# Patient Record
Sex: FEMALE | Race: WHITE
[De-identification: names, ages, dates, MRNs, and addresses within clinical notes are randomized per-mention and may not be internally consistent; named-entity substitution may affect disease eponyms.]

---

## 2018-06-05 ENCOUNTER — RECORD ABSTRACTING (OUTPATIENT)
Age: 78
End: 2018-06-05

## 2018-06-05 DIAGNOSIS — Z87.19 PERSONAL HISTORY OF OTHER DISEASES OF THE DIGESTIVE SYSTEM: ICD-10-CM

## 2018-06-25 ENCOUNTER — APPOINTMENT (OUTPATIENT)
Dept: HEART AND VASCULAR | Facility: CLINIC | Age: 78
End: 2018-06-25
Payer: MEDICARE

## 2018-06-28 ENCOUNTER — APPOINTMENT (OUTPATIENT)
Dept: HEART AND VASCULAR | Facility: CLINIC | Age: 78
End: 2018-06-28
Payer: MEDICARE

## 2018-06-28 VITALS — BODY MASS INDEX: 34.2 KG/M2 | HEIGHT: 63 IN | WEIGHT: 193 LBS

## 2018-06-28 VITALS — DIASTOLIC BLOOD PRESSURE: 70 MMHG | SYSTOLIC BLOOD PRESSURE: 130 MMHG

## 2018-06-28 PROCEDURE — 99214 OFFICE O/P EST MOD 30 MIN: CPT | Mod: 25

## 2018-06-28 PROCEDURE — 93000 ELECTROCARDIOGRAM COMPLETE: CPT

## 2018-10-04 ENCOUNTER — APPOINTMENT (OUTPATIENT)
Dept: HEART AND VASCULAR | Facility: CLINIC | Age: 78
End: 2018-10-04
Payer: MEDICARE

## 2018-10-04 VITALS — HEIGHT: 63 IN | WEIGHT: 192 LBS | BODY MASS INDEX: 34.02 KG/M2

## 2018-10-04 VITALS — SYSTOLIC BLOOD PRESSURE: 130 MMHG | DIASTOLIC BLOOD PRESSURE: 75 MMHG

## 2018-10-04 PROCEDURE — G0008: CPT

## 2018-10-04 PROCEDURE — 90662 IIV NO PRSV INCREASED AG IM: CPT

## 2018-10-04 PROCEDURE — 99214 OFFICE O/P EST MOD 30 MIN: CPT

## 2018-10-04 PROCEDURE — 93000 ELECTROCARDIOGRAM COMPLETE: CPT

## 2018-10-05 LAB
ALBUMIN SERPL ELPH-MCNC: 4.3 G/DL
ALP BLD-CCNC: 97 U/L
ALT SERPL-CCNC: 17 U/L
ANION GAP SERPL CALC-SCNC: 19 MMOL/L
AST SERPL-CCNC: 14 U/L
BASOPHILS # BLD AUTO: 0.04 K/UL
BASOPHILS NFR BLD AUTO: 0.4 %
BILIRUB SERPL-MCNC: 0.3 MG/DL
BUN SERPL-MCNC: 18 MG/DL
CALCIUM SERPL-MCNC: 10 MG/DL
CHLORIDE SERPL-SCNC: 101 MMOL/L
CHOLEST SERPL-MCNC: 229 MG/DL
CHOLEST/HDLC SERPL: 3.9 RATIO
CO2 SERPL-SCNC: 22 MMOL/L
CREAT SERPL-MCNC: 0.66 MG/DL
EOSINOPHIL # BLD AUTO: 0.04 K/UL
EOSINOPHIL NFR BLD AUTO: 0.4 %
FOLATE SERPL-MCNC: 7.5 NG/ML
GLUCOSE SERPL-MCNC: 211 MG/DL
HBA1C MFR BLD HPLC: 8.6 %
HCT VFR BLD CALC: 42.3 %
HDLC SERPL-MCNC: 59 MG/DL
HGB BLD-MCNC: 13.4 G/DL
IMM GRANULOCYTES NFR BLD AUTO: 0.4 %
LDLC SERPL CALC-MCNC: 128 MG/DL
LYMPHOCYTES # BLD AUTO: 1.45 K/UL
LYMPHOCYTES NFR BLD AUTO: 14.2 %
MAN DIFF?: NORMAL
MCHC RBC-ENTMCNC: 27.8 PG
MCHC RBC-ENTMCNC: 31.7 GM/DL
MCV RBC AUTO: 87.8 FL
MONOCYTES # BLD AUTO: 0.38 K/UL
MONOCYTES NFR BLD AUTO: 3.7 %
NEUTROPHILS # BLD AUTO: 8.24 K/UL
NEUTROPHILS NFR BLD AUTO: 80.9 %
PLATELET # BLD AUTO: 283 K/UL
POTASSIUM SERPL-SCNC: 4.7 MMOL/L
PROT SERPL-MCNC: 6.9 G/DL
RBC # BLD: 4.82 M/UL
RBC # FLD: 14.6 %
SODIUM SERPL-SCNC: 142 MMOL/L
T3 SERPL-MCNC: 90 NG/DL
T3FREE SERPL-MCNC: 2.59 PG/ML
T4 FREE SERPL-MCNC: 1.2 NG/DL
T4 SERPL-MCNC: 7.4 UG/DL
TRIGL SERPL-MCNC: 211 MG/DL
TSH SERPL-ACNC: 1.99 UIU/ML
VIT B12 SERPL-MCNC: 628 PG/ML
WBC # FLD AUTO: 10.19 K/UL

## 2019-02-12 ENCOUNTER — APPOINTMENT (OUTPATIENT)
Dept: HEART AND VASCULAR | Facility: CLINIC | Age: 79
End: 2019-02-12

## 2019-02-26 ENCOUNTER — APPOINTMENT (OUTPATIENT)
Dept: HEART AND VASCULAR | Facility: CLINIC | Age: 79
End: 2019-02-26
Payer: MEDICARE

## 2019-02-26 VITALS — HEIGHT: 63 IN | WEIGHT: 196 LBS | BODY MASS INDEX: 34.73 KG/M2

## 2019-02-26 VITALS — SYSTOLIC BLOOD PRESSURE: 130 MMHG | DIASTOLIC BLOOD PRESSURE: 80 MMHG

## 2019-02-26 VITALS — SYSTOLIC BLOOD PRESSURE: 140 MMHG | DIASTOLIC BLOOD PRESSURE: 80 MMHG

## 2019-02-26 PROCEDURE — 93000 ELECTROCARDIOGRAM COMPLETE: CPT

## 2019-02-26 NOTE — PHYSICAL EXAM
[General Appearance - Well Developed] : well developed [Normal Appearance] : normal appearance [Well Groomed] : well groomed [General Appearance - Well Nourished] : well nourished [No Deformities] : no deformities [General Appearance - In No Acute Distress] : no acute distress [Normal Conjunctiva] : the conjunctiva exhibited no abnormalities [Eyelids - No Xanthelasma] : the eyelids demonstrated no xanthelasmas [Normal Oral Mucosa] : normal oral mucosa [No Oral Pallor] : no oral pallor [No Oral Cyanosis] : no oral cyanosis [Normal Jugular Venous A Waves Present] : normal jugular venous A waves present [Normal Jugular Venous V Waves Present] : normal jugular venous V waves present [No Jugular Venous Richardson A Waves] : no jugular venous richardson A waves [Respiration, Rhythm And Depth] : normal respiratory rhythm and effort [Exaggerated Use Of Accessory Muscles For Inspiration] : no accessory muscle use [Auscultation Breath Sounds / Voice Sounds] : lungs were clear to auscultation bilaterally [Abdomen Soft] : soft [Abdomen Tenderness] : non-tender [Abdomen Mass (___ Cm)] : no abdominal mass palpated [Abnormal Walk] : normal gait [Gait - Sufficient For Exercise Testing] : the gait was sufficient for exercise testing [Nail Clubbing] : no clubbing of the fingernails [Cyanosis, Localized] : no localized cyanosis [Petechial Hemorrhages (___cm)] : no petechial hemorrhages [Skin Color & Pigmentation] : normal skin color and pigmentation [] : no rash [No Venous Stasis] : no venous stasis [Skin Lesions] : no skin lesions [No Skin Ulcers] : no skin ulcer [No Xanthoma] : no  xanthoma was observed

## 2019-02-26 NOTE — HISTORY OF PRESENT ILLNESS
[FreeTextEntry1] : Patient presents for routine followup for diabetes and hypertension she denies any symptoms of headaches diplopia leg edema chest pain or shortness of breath. Her home glucose testing usually in the morning is about 170 she has had mixed compliance with dietary restrictions. Most recent hemoglobin A1c was 8.2 measured at her endocrinologist Dr. Garcia she was seen her approximately one month's time\par 2/26/19 recent eye exam c/w macular degeneration \par blood sugars volatile \par non compliant with ADA diet \par last HGAIC 8.6 TRIG 211\par \par Cr .66

## 2019-02-26 NOTE — DISCUSSION/SUMMARY
[FreeTextEntry1] : EKG at the time of the visit revealed a sinus rhythm with a right bundle branch block which is unchanged from prior tracings. Patient's blood pressure was well controlled on her current time he'll start was renewed she will also remain on metoprolol her blood sugars will be obtained at her next endocrinology visit diet and exercise compliance with medications were reinforced.

## 2019-02-26 NOTE — REVIEW OF SYSTEMS
[Blurry Vision] : no blurred vision [Eyeglasses] : not currently wearing eyeglasses [Shortness Of Breath] : no shortness of breath [Dysuria] : no dysuria

## 2019-02-27 LAB
ALBUMIN SERPL ELPH-MCNC: 4.3 G/DL
ALP BLD-CCNC: 101 U/L
ALT SERPL-CCNC: 20 U/L
ANION GAP SERPL CALC-SCNC: 12 MMOL/L
AST SERPL-CCNC: 14 U/L
BASOPHILS # BLD AUTO: 0.08 K/UL
BASOPHILS NFR BLD AUTO: 0.8 %
BILIRUB SERPL-MCNC: 0.3 MG/DL
BUN SERPL-MCNC: 16 MG/DL
CALCIUM SERPL-MCNC: 10.1 MG/DL
CHLORIDE SERPL-SCNC: 100 MMOL/L
CHOLEST SERPL-MCNC: 214 MG/DL
CHOLEST/HDLC SERPL: 3.9 RATIO
CO2 SERPL-SCNC: 27 MMOL/L
CREAT SERPL-MCNC: 0.63 MG/DL
EOSINOPHIL # BLD AUTO: 0.1 K/UL
EOSINOPHIL NFR BLD AUTO: 1 %
GLUCOSE SERPL-MCNC: 240 MG/DL
HBA1C MFR BLD HPLC: 8.2 %
HCT VFR BLD CALC: 43.6 %
HDLC SERPL-MCNC: 55 MG/DL
HGB BLD-MCNC: 12.8 G/DL
IMM GRANULOCYTES NFR BLD AUTO: 0.5 %
LDLC SERPL CALC-MCNC: 106 MG/DL
LYMPHOCYTES # BLD AUTO: 1.61 K/UL
LYMPHOCYTES NFR BLD AUTO: 16.8 %
MAN DIFF?: NORMAL
MCHC RBC-ENTMCNC: 26.6 PG
MCHC RBC-ENTMCNC: 29.4 GM/DL
MCV RBC AUTO: 90.5 FL
MONOCYTES # BLD AUTO: 0.61 K/UL
MONOCYTES NFR BLD AUTO: 6.4 %
NEUTROPHILS # BLD AUTO: 7.12 K/UL
NEUTROPHILS NFR BLD AUTO: 74.5 %
PLATELET # BLD AUTO: 281 K/UL
POTASSIUM SERPL-SCNC: 4.7 MMOL/L
PROT SERPL-MCNC: 6.9 G/DL
RBC # BLD: 4.82 M/UL
RBC # FLD: 13.8 %
SODIUM SERPL-SCNC: 139 MMOL/L
T3 SERPL-MCNC: 92 NG/DL
T3FREE SERPL-MCNC: 2.48 PG/ML
T4 FREE SERPL-MCNC: 1.2 NG/DL
T4 SERPL-MCNC: 6.2 UG/DL
TRIGL SERPL-MCNC: 266 MG/DL
TSH SERPL-ACNC: 2.36 UIU/ML
WBC # FLD AUTO: 9.57 K/UL

## 2019-04-15 ENCOUNTER — RX RENEWAL (OUTPATIENT)
Age: 79
End: 2019-04-15

## 2019-06-03 ENCOUNTER — APPOINTMENT (OUTPATIENT)
Dept: HEART AND VASCULAR | Facility: CLINIC | Age: 79
End: 2019-06-03
Payer: MEDICARE

## 2019-06-03 VITALS — HEIGHT: 63 IN | WEIGHT: 195 LBS | BODY MASS INDEX: 34.55 KG/M2

## 2019-06-03 VITALS — DIASTOLIC BLOOD PRESSURE: 80 MMHG | SYSTOLIC BLOOD PRESSURE: 130 MMHG

## 2019-06-03 DIAGNOSIS — Z87.440 PERSONAL HISTORY OF URINARY (TRACT) INFECTIONS: ICD-10-CM

## 2019-06-03 PROCEDURE — 93880 EXTRACRANIAL BILAT STUDY: CPT

## 2019-06-03 PROCEDURE — 99214 OFFICE O/P EST MOD 30 MIN: CPT

## 2019-06-03 PROCEDURE — 93000 ELECTROCARDIOGRAM COMPLETE: CPT

## 2019-06-03 PROCEDURE — 93306 TTE W/DOPPLER COMPLETE: CPT

## 2019-06-04 ENCOUNTER — RESULT CHARGE (OUTPATIENT)
Age: 79
End: 2019-06-04

## 2019-06-04 LAB
APPEARANCE: ABNORMAL
BACTERIA: ABNORMAL
BILIRUBIN URINE: NEGATIVE
BLOOD URINE: NEGATIVE
COLOR: YELLOW
GLUCOSE QUALITATIVE U: ABNORMAL
HYALINE CASTS: 4 /LPF
KETONES URINE: NEGATIVE
LEUKOCYTE ESTERASE URINE: ABNORMAL
MICROSCOPIC-UA: NORMAL
NITRITE URINE: NEGATIVE
PH URINE: 6
PROTEIN URINE: NORMAL
RED BLOOD CELLS URINE: 4 /HPF
SPECIFIC GRAVITY URINE: 1.02
SQUAMOUS EPITHELIAL CELLS: >27 /HPF
UROBILINOGEN URINE: NORMAL
WHITE BLOOD CELLS URINE: 127 /HPF

## 2019-06-04 NOTE — HISTORY OF PRESENT ILLNESS
[FreeTextEntry1] : the patient is a 79-year-old female with long-standing history of hypertension and diabetes who recently was noted to have elevated liver function tests and was seen by a gastroenterologist for evaluation of elevated LFTs with a sonogram which is spending\par She denies any current symptoms of abdominal pain she does have some dysuria and frequency and does believe she might have a urine infection. EKG reveals a right bundle branch block.\par

## 2019-06-04 NOTE — PHYSICAL EXAM
[Normal Appearance] : normal appearance [General Appearance - Well Developed] : well developed [Well Groomed] : well groomed [General Appearance - Well Nourished] : well nourished [No Deformities] : no deformities [General Appearance - In No Acute Distress] : no acute distress [Normal Conjunctiva] : the conjunctiva exhibited no abnormalities [Eyelids - No Xanthelasma] : the eyelids demonstrated no xanthelasmas [Normal Oral Mucosa] : normal oral mucosa [No Oral Cyanosis] : no oral cyanosis [No Oral Pallor] : no oral pallor [Normal Jugular Venous A Waves Present] : normal jugular venous A waves present [Normal Jugular Venous V Waves Present] : normal jugular venous V waves present [No Jugular Venous Richardson A Waves] : no jugular venous richardson A waves [Respiration, Rhythm And Depth] : normal respiratory rhythm and effort [Auscultation Breath Sounds / Voice Sounds] : lungs were clear to auscultation bilaterally [Exaggerated Use Of Accessory Muscles For Inspiration] : no accessory muscle use [Normal S1] : normal S1 [II] : a grade 2 [Normal S2] : normal S2 [Right Carotid Bruit] : right carotid bruit heard [Left Carotid Bruit] : left carotid bruit heard [Abdomen Soft] : soft [Abdomen Tenderness] : non-tender [Abdomen Mass (___ Cm)] : no abdominal mass palpated [Abnormal Walk] : normal gait [Gait - Sufficient For Exercise Testing] : the gait was sufficient for exercise testing [Cyanosis, Localized] : no localized cyanosis [Nail Clubbing] : no clubbing of the fingernails [Skin Color & Pigmentation] : normal skin color and pigmentation [Petechial Hemorrhages (___cm)] : no petechial hemorrhages [] : no rash [No Venous Stasis] : no venous stasis [Skin Lesions] : no skin lesions [No Skin Ulcers] : no skin ulcer [No Xanthoma] : no  xanthoma was observed [FreeTextEntry1] : pos suprapubic pain no cvat

## 2019-06-04 NOTE — DISCUSSION/SUMMARY
[FreeTextEntry1] : EKG at the time of the visit revealed a sinus rhythm with a right bundle branch block which is unchanged from prior tracings. Patient's blood pressure was well controlled on her current time he'll start was renewed she will also remain on metoprolol her blood sugars will be obtained at her next endocrinology visit diet and exercise compliance with medications were reinforced.\par LVEF  is preserved based on echocardiogram soft homogeneous plaque was noted on carotid duplex target LDL should be less than 70 in keeping with primary prevention for diabetic patients\par patient has signs and symptoms of a UTI urinalysis was obtained and did show pyuria as well as bacteria ciprofloxacin was referred to the patient

## 2019-07-15 ENCOUNTER — RX RENEWAL (OUTPATIENT)
Age: 79
End: 2019-07-15

## 2019-08-08 ENCOUNTER — RX RENEWAL (OUTPATIENT)
Age: 79
End: 2019-08-08

## 2019-10-04 ENCOUNTER — LABORATORY RESULT (OUTPATIENT)
Age: 79
End: 2019-10-04

## 2019-10-04 ENCOUNTER — APPOINTMENT (OUTPATIENT)
Dept: HEART AND VASCULAR | Facility: CLINIC | Age: 79
End: 2019-10-04
Payer: MEDICARE

## 2019-10-04 ENCOUNTER — NON-APPOINTMENT (OUTPATIENT)
Age: 79
End: 2019-10-04

## 2019-10-04 VITALS
HEIGHT: 63 IN | DIASTOLIC BLOOD PRESSURE: 80 MMHG | SYSTOLIC BLOOD PRESSURE: 135 MMHG | BODY MASS INDEX: 34.2 KG/M2 | WEIGHT: 193 LBS

## 2019-10-04 VITALS — SYSTOLIC BLOOD PRESSURE: 135 MMHG | DIASTOLIC BLOOD PRESSURE: 80 MMHG

## 2019-10-04 PROCEDURE — 93000 ELECTROCARDIOGRAM COMPLETE: CPT

## 2019-10-04 PROCEDURE — 99214 OFFICE O/P EST MOD 30 MIN: CPT

## 2019-10-04 NOTE — HISTORY OF PRESENT ILLNESS
[Preoperative Visit] : for a medical evaluation prior to surgery [Scheduled Procedure ___] : a [unfilled] [Date of Surgery ___] : on [unfilled] [Good] : Good [Surgeon Name ___] : surgeon: [unfilled] [Chills] : no chills [Fever] : no fever [Fatigue] : no fatigue [Dyspnea] : no dyspnea [Cough] : no cough [Chest Pain] : no chest pain [Urinary Frequency] : no urinary frequency [Dysuria] : no dysuria [Nausea] : no nausea [Vomiting] : no vomiting [de-identified] : Tita [FreeTextEntry1] : Preeop for gallbladder surgery\par On meds for DM and BP \par No recent chest pain or dyspnea \par prior echo LVEF 55% \par Baseline RBBB

## 2019-10-04 NOTE — DISCUSSION/SUMMARY
[Procedure Intermediate Risk] : the procedure risk is intermediate [Patient Low Risk] : the patient is a low surgical risk [Optimized for Surgery] : the patient is optimized for surgery [FreeTextEntry1] : Stop ASA  1 week preop

## 2019-10-04 NOTE — ASSESSMENT
[FreeTextEntry1] : Impression \par optimized for planned gall blader surgery \par Stop asa 1 week preop

## 2019-10-04 NOTE — PHYSICAL EXAM
[General Appearance - Well Developed] : well developed [Well Groomed] : well groomed [Normal Appearance] : normal appearance [No Deformities] : no deformities [General Appearance - Well Nourished] : well nourished [General Appearance - In No Acute Distress] : no acute distress [Normal Conjunctiva] : the conjunctiva exhibited no abnormalities [Eyelids - No Xanthelasma] : the eyelids demonstrated no xanthelasmas [Normal Oral Mucosa] : normal oral mucosa [No Oral Pallor] : no oral pallor [No Oral Cyanosis] : no oral cyanosis [Normal Jugular Venous V Waves Present] : normal jugular venous V waves present [Normal Jugular Venous A Waves Present] : normal jugular venous A waves present [No Jugular Venous Richardson A Waves] : no jugular venous richardson A waves [Exaggerated Use Of Accessory Muscles For Inspiration] : no accessory muscle use [Respiration, Rhythm And Depth] : normal respiratory rhythm and effort [Auscultation Breath Sounds / Voice Sounds] : lungs were clear to auscultation bilaterally [Heart Rate And Rhythm] : heart rate and rhythm were normal [Heart Sounds] : normal S1 and S2 [Murmurs] : no murmurs present [Abdomen Tenderness] : non-tender [Abdomen Soft] : soft [Abdomen Mass (___ Cm)] : no abdominal mass palpated [Abnormal Walk] : normal gait [Gait - Sufficient For Exercise Testing] : the gait was sufficient for exercise testing [Cyanosis, Localized] : no localized cyanosis [Nail Clubbing] : no clubbing of the fingernails [Petechial Hemorrhages (___cm)] : no petechial hemorrhages [] : no ischemic changes

## 2019-10-06 ENCOUNTER — MED ADMIN CHARGE (OUTPATIENT)
Age: 79
End: 2019-10-06

## 2019-10-06 LAB
25(OH)D3 SERPL-MCNC: 40.1 NG/ML
ALBUMIN SERPL ELPH-MCNC: 4 G/DL
ALP BLD-CCNC: 111 U/L
ALT SERPL-CCNC: 12 U/L
ANION GAP SERPL CALC-SCNC: 14 MMOL/L
APTT BLD: 32.6 SEC
AST SERPL-CCNC: 16 U/L
BASOPHILS # BLD AUTO: 0.08 K/UL
BASOPHILS NFR BLD AUTO: 0.8 %
BILIRUB SERPL-MCNC: 0.3 MG/DL
BUN SERPL-MCNC: 17 MG/DL
CALCIUM SERPL-MCNC: 9.5 MG/DL
CHLORIDE SERPL-SCNC: 99 MMOL/L
CHOLEST SERPL-MCNC: 189 MG/DL
CHOLEST/HDLC SERPL: 4.3 RATIO
CO2 SERPL-SCNC: 25 MMOL/L
CREAT SERPL-MCNC: 0.61 MG/DL
EOSINOPHIL # BLD AUTO: 0.28 K/UL
EOSINOPHIL NFR BLD AUTO: 2.9 %
ESTIMATED AVERAGE GLUCOSE: 203 MG/DL
FOLATE SERPL-MCNC: >20 NG/ML
GLUCOSE SERPL-MCNC: 227 MG/DL
HBA1C MFR BLD HPLC: 8.7 %
HCT VFR BLD CALC: 38.4 %
HDLC SERPL-MCNC: 44 MG/DL
HGB BLD-MCNC: 11.8 G/DL
IMM GRANULOCYTES NFR BLD AUTO: 0.3 %
INR PPP: 1.12 RATIO
LDLC SERPL CALC-MCNC: 112 MG/DL
LYMPHOCYTES # BLD AUTO: 1.63 K/UL
LYMPHOCYTES NFR BLD AUTO: 16.7 %
MAN DIFF?: NORMAL
MCHC RBC-ENTMCNC: 26.4 PG
MCHC RBC-ENTMCNC: 30.7 GM/DL
MCV RBC AUTO: 85.9 FL
MONOCYTES # BLD AUTO: 0.51 K/UL
MONOCYTES NFR BLD AUTO: 5.2 %
NEUTROPHILS # BLD AUTO: 7.21 K/UL
NEUTROPHILS NFR BLD AUTO: 74.1 %
PLATELET # BLD AUTO: 332 K/UL
POTASSIUM SERPL-SCNC: 4.6 MMOL/L
PROT SERPL-MCNC: 6.7 G/DL
PT BLD: 12.9 SEC
RBC # BLD: 4.47 M/UL
RBC # FLD: 13.6 %
SODIUM SERPL-SCNC: 138 MMOL/L
T3 SERPL-MCNC: 100 NG/DL
T3FREE SERPL-MCNC: 2.51 PG/ML
T3RU NFR SERPL: 1 TBI
T4 FREE SERPL-MCNC: 1.3 NG/DL
T4 SERPL-MCNC: 6.9 UG/DL
TRIGL SERPL-MCNC: 165 MG/DL
TSH SERPL-ACNC: 1.84 UIU/ML
VIT B12 SERPL-MCNC: 1234 PG/ML
WBC # FLD AUTO: 9.74 K/UL

## 2019-10-28 ENCOUNTER — APPOINTMENT (OUTPATIENT)
Dept: HEART AND VASCULAR | Facility: CLINIC | Age: 79
End: 2019-10-28

## 2019-11-05 ENCOUNTER — RX RENEWAL (OUTPATIENT)
Age: 79
End: 2019-11-05

## 2019-12-18 ENCOUNTER — RX RENEWAL (OUTPATIENT)
Age: 79
End: 2019-12-18

## 2020-01-20 ENCOUNTER — NON-APPOINTMENT (OUTPATIENT)
Age: 80
End: 2020-01-20

## 2020-01-20 ENCOUNTER — APPOINTMENT (OUTPATIENT)
Dept: HEART AND VASCULAR | Facility: CLINIC | Age: 80
End: 2020-01-20
Payer: MEDICARE

## 2020-01-20 VITALS
HEIGHT: 63 IN | WEIGHT: 177 LBS | BODY MASS INDEX: 31.36 KG/M2 | DIASTOLIC BLOOD PRESSURE: 80 MMHG | HEART RATE: 71 BPM | SYSTOLIC BLOOD PRESSURE: 145 MMHG

## 2020-01-20 PROCEDURE — 99214 OFFICE O/P EST MOD 30 MIN: CPT

## 2020-01-20 PROCEDURE — 36415 COLL VENOUS BLD VENIPUNCTURE: CPT

## 2020-01-20 PROCEDURE — 93000 ELECTROCARDIOGRAM COMPLETE: CPT

## 2020-01-20 RX ORDER — DULAGLUTIDE 1.5 MG/.5ML
1.5 INJECTION, SOLUTION SUBCUTANEOUS
Refills: 0 | Status: DISCONTINUED | COMMUNITY
End: 2020-01-20

## 2020-01-20 RX ORDER — DEXLANSOPRAZOLE 60 MG/1
60 CAPSULE, DELAYED RELEASE ORAL
Refills: 0 | Status: DISCONTINUED | COMMUNITY
End: 2020-01-20

## 2020-01-20 RX ORDER — CIPROFLOXACIN HYDROCHLORIDE 500 MG/1
500 TABLET, FILM COATED ORAL TWICE DAILY
Qty: 20 | Refills: 0 | Status: DISCONTINUED | COMMUNITY
Start: 2019-06-03 | End: 2020-01-20

## 2020-01-20 NOTE — PHYSICAL EXAM
[General Appearance - Well Developed] : well developed [Well Groomed] : well groomed [Normal Appearance] : normal appearance [General Appearance - Well Nourished] : well nourished [No Deformities] : no deformities [General Appearance - In No Acute Distress] : no acute distress [Normal Conjunctiva] : the conjunctiva exhibited no abnormalities [Eyelids - No Xanthelasma] : the eyelids demonstrated no xanthelasmas [Normal Oral Mucosa] : normal oral mucosa [No Oral Pallor] : no oral pallor [Normal Jugular Venous A Waves Present] : normal jugular venous A waves present [No Oral Cyanosis] : no oral cyanosis [Normal Jugular Venous V Waves Present] : normal jugular venous V waves present [No Jugular Venous Richardson A Waves] : no jugular venous richardson A waves [Exaggerated Use Of Accessory Muscles For Inspiration] : no accessory muscle use [Respiration, Rhythm And Depth] : normal respiratory rhythm and effort [Auscultation Breath Sounds / Voice Sounds] : lungs were clear to auscultation bilaterally [Normal] : normal [Rhythm Regular] : regular [Normal S1] : normal S1 [Normal S2] : normal S2 [Right Carotid Bruit] : right carotid bruit heard [II] : a grade 2 [Left Carotid Bruit] : left carotid bruit heard [Abdomen Soft] : soft [Abdomen Tenderness] : non-tender [Abdomen Mass (___ Cm)] : no abdominal mass palpated [Abnormal Walk] : normal gait [Gait - Sufficient For Exercise Testing] : the gait was sufficient for exercise testing [Cyanosis, Localized] : no localized cyanosis [Petechial Hemorrhages (___cm)] : no petechial hemorrhages [Nail Clubbing] : no clubbing of the fingernails [Skin Color & Pigmentation] : normal skin color and pigmentation [No Venous Stasis] : no venous stasis [] : no rash [Skin Lesions] : no skin lesions [No Skin Ulcers] : no skin ulcer [No Xanthoma] : no  xanthoma was observed

## 2020-01-20 NOTE — ASSESSMENT
[FreeTextEntry1] : Assessment\par Diabetes is under better control with a hemoglobin A1c of 7.2 which is improved from prior lab workWhen her hemoglobin A1c was 8.7\par Upper GI bleed seems to have improved with treatment of a PPI CBC NI and studies will be drawn today to see if any iron therapy as needed. Blood pressure is well-controlled EKG to follow a right bundle branch block was obtained\par was taken off trulicty during hospital stay \par May nb=need to resume in future

## 2020-01-20 NOTE — REVIEW OF SYSTEMS
[Negative] : ENT [Shortness Of Breath] : no shortness of breath [Cough] : no cough [Chest Pain] : no chest pain [Heartburn] : no heartburn [Dysphagia] : no dysphagia [Abdominal Pain] : no abdominal pain

## 2020-01-21 LAB
BASOPHILS # BLD AUTO: 0.09 K/UL
BASOPHILS NFR BLD AUTO: 1.1 %
EOSINOPHIL # BLD AUTO: 0.14 K/UL
EOSINOPHIL NFR BLD AUTO: 1.7 %
FERRITIN SERPL-MCNC: 25 NG/ML
HCT VFR BLD CALC: 38.3 %
HGB BLD-MCNC: 11.3 G/DL
IMM GRANULOCYTES NFR BLD AUTO: 0.2 %
IRON SERPL-MCNC: 44 UG/DL
LYMPHOCYTES # BLD AUTO: 1.32 K/UL
LYMPHOCYTES NFR BLD AUTO: 16.3 %
MAN DIFF?: NORMAL
MCHC RBC-ENTMCNC: 25.5 PG
MCHC RBC-ENTMCNC: 29.5 GM/DL
MCV RBC AUTO: 86.5 FL
MONOCYTES # BLD AUTO: 0.55 K/UL
MONOCYTES NFR BLD AUTO: 6.8 %
NEUTROPHILS # BLD AUTO: 6 K/UL
NEUTROPHILS NFR BLD AUTO: 73.9 %
PLATELET # BLD AUTO: 273 K/UL
RBC # BLD: 4.43 M/UL
RBC # BLD: 4.43 M/UL
RBC # FLD: 14.2 %
RETICS # AUTO: 1.6 %
RETICS AGGREG/RBC NFR: 71.8 K/UL
WBC # FLD AUTO: 8.12 K/UL

## 2020-05-19 ENCOUNTER — NON-APPOINTMENT (OUTPATIENT)
Age: 80
End: 2020-05-19

## 2020-05-19 ENCOUNTER — LABORATORY RESULT (OUTPATIENT)
Age: 80
End: 2020-05-19

## 2020-05-19 ENCOUNTER — APPOINTMENT (OUTPATIENT)
Dept: HEART AND VASCULAR | Facility: CLINIC | Age: 80
End: 2020-05-19
Payer: MEDICARE

## 2020-05-19 VITALS
DIASTOLIC BLOOD PRESSURE: 80 MMHG | HEIGHT: 63 IN | BODY MASS INDEX: 31.54 KG/M2 | SYSTOLIC BLOOD PRESSURE: 140 MMHG | WEIGHT: 178 LBS | HEART RATE: 85 BPM

## 2020-05-19 PROCEDURE — 99214 OFFICE O/P EST MOD 30 MIN: CPT

## 2020-05-19 PROCEDURE — 93000 ELECTROCARDIOGRAM COMPLETE: CPT

## 2020-05-19 PROCEDURE — 36415 COLL VENOUS BLD VENIPUNCTURE: CPT

## 2020-05-19 NOTE — REASON FOR VISIT
[FreeTextEntry1] : Patient is a 79-year-old white female with diabetes mellitus and hypertension who was admitted to the hospital approximately 2 months ago with a blood loss stemming from a upper GI bleed warranting 2 units of packed red cells. Patient underwent an endoscopy and was found to have a gastric ulcer PPI agents. Aspirin was discontinued. She has since recovered endoscopy yesterday which proved to have resolved the ulcer. Recent hemoglobin A1c was 7.2 with LDL of 118\par 5/19/ 2020 \par wgt up due to coronavirus isolation last hgaic 8.7\par no sob sscp

## 2020-05-19 NOTE — PHYSICAL EXAM
[Normal Appearance] : normal appearance [General Appearance - Well Developed] : well developed [General Appearance - Well Nourished] : well nourished [Well Groomed] : well groomed [No Deformities] : no deformities [General Appearance - In No Acute Distress] : no acute distress [Normal Conjunctiva] : the conjunctiva exhibited no abnormalities [Eyelids - No Xanthelasma] : the eyelids demonstrated no xanthelasmas [No Oral Cyanosis] : no oral cyanosis [No Oral Pallor] : no oral pallor [Normal Oral Mucosa] : normal oral mucosa [Normal Jugular Venous V Waves Present] : normal jugular venous V waves present [Normal Jugular Venous A Waves Present] : normal jugular venous A waves present [No Jugular Venous Richardson A Waves] : no jugular venous richardson A waves [Auscultation Breath Sounds / Voice Sounds] : lungs were clear to auscultation bilaterally [Respiration, Rhythm And Depth] : normal respiratory rhythm and effort [Exaggerated Use Of Accessory Muscles For Inspiration] : no accessory muscle use [Abdomen Tenderness] : non-tender [Abdomen Soft] : soft [Abdomen Mass (___ Cm)] : no abdominal mass palpated [Nail Clubbing] : no clubbing of the fingernails [Abnormal Walk] : normal gait [Gait - Sufficient For Exercise Testing] : the gait was sufficient for exercise testing [Petechial Hemorrhages (___cm)] : no petechial hemorrhages [Cyanosis, Localized] : no localized cyanosis [Skin Color & Pigmentation] : normal skin color and pigmentation [No Venous Stasis] : no venous stasis [] : no rash [Skin Lesions] : no skin lesions [No Skin Ulcers] : no skin ulcer [No Xanthoma] : no  xanthoma was observed [Normal] : normal [Normal S1] : normal S1 [Rhythm Regular] : regular [Normal S2] : normal S2 [Left Carotid Bruit] : left carotid bruit heard [Right Carotid Bruit] : right carotid bruit heard [II] : a grade 2

## 2020-05-19 NOTE — ASSESSMENT
[FreeTextEntry1] : Assessment\par recent hgaic eleved \par along with recent wgt ain due to covid \par (overeating no exercise)\par bloods pending \par meds reviewed and renewed

## 2020-05-19 NOTE — REVIEW OF SYSTEMS
[Negative] : ENT [Shortness Of Breath] : no shortness of breath [Chest Pain] : no chest pain [Cough] : no cough [Abdominal Pain] : no abdominal pain [Heartburn] : no heartburn [Dysphagia] : no dysphagia

## 2020-05-20 LAB
25(OH)D3 SERPL-MCNC: 25.9 NG/ML
ALBUMIN SERPL ELPH-MCNC: 4.1 G/DL
ALP BLD-CCNC: 102 U/L
ALT SERPL-CCNC: 15 U/L
ANION GAP SERPL CALC-SCNC: 15 MMOL/L
AST SERPL-CCNC: 13 U/L
BASOPHILS # BLD AUTO: 0.07 K/UL
BASOPHILS NFR BLD AUTO: 0.8 %
BILIRUB SERPL-MCNC: 0.4 MG/DL
BUN SERPL-MCNC: 21 MG/DL
CALCIUM SERPL-MCNC: 9.5 MG/DL
CHLORIDE SERPL-SCNC: 99 MMOL/L
CHOLEST SERPL-MCNC: 210 MG/DL
CHOLEST/HDLC SERPL: 3.8 RATIO
CO2 SERPL-SCNC: 26 MMOL/L
CREAT SERPL-MCNC: 0.7 MG/DL
EOSINOPHIL # BLD AUTO: 0.08 K/UL
EOSINOPHIL NFR BLD AUTO: 0.9 %
ESTIMATED AVERAGE GLUCOSE: 192 MG/DL
FOLATE SERPL-MCNC: 9.2 NG/ML
GLUCOSE SERPL-MCNC: 294 MG/DL
HBA1C MFR BLD HPLC: 8.3 %
HCT VFR BLD CALC: 40 %
HDLC SERPL-MCNC: 56 MG/DL
HGB BLD-MCNC: 12.2 G/DL
IMM GRANULOCYTES NFR BLD AUTO: 0.5 %
LDLC SERPL CALC-MCNC: 111 MG/DL
LYMPHOCYTES # BLD AUTO: 1.17 K/UL
LYMPHOCYTES NFR BLD AUTO: 13.6 %
MAN DIFF?: NORMAL
MCHC RBC-ENTMCNC: 26.1 PG
MCHC RBC-ENTMCNC: 30.5 GM/DL
MCV RBC AUTO: 85.7 FL
MONOCYTES # BLD AUTO: 0.46 K/UL
MONOCYTES NFR BLD AUTO: 5.3 %
NEUTROPHILS # BLD AUTO: 6.79 K/UL
NEUTROPHILS NFR BLD AUTO: 78.9 %
PLATELET # BLD AUTO: 252 K/UL
POTASSIUM SERPL-SCNC: 4.8 MMOL/L
PROT SERPL-MCNC: 6.5 G/DL
RBC # BLD: 4.67 M/UL
RBC # FLD: 14.7 %
SODIUM SERPL-SCNC: 140 MMOL/L
T3RU NFR SERPL: 1.1 TBI
T4 FREE SERPL-MCNC: 1 NG/DL
T4 SERPL-MCNC: 5.7 UG/DL
TRIGL SERPL-MCNC: 217 MG/DL
TSH SERPL-ACNC: 2.03 UIU/ML
VIT B12 SERPL-MCNC: 634 PG/ML
WBC # FLD AUTO: 8.61 K/UL

## 2020-09-15 ENCOUNTER — NON-APPOINTMENT (OUTPATIENT)
Age: 80
End: 2020-09-15

## 2020-09-15 ENCOUNTER — APPOINTMENT (OUTPATIENT)
Dept: HEART AND VASCULAR | Facility: CLINIC | Age: 80
End: 2020-09-15
Payer: MEDICARE

## 2020-09-15 VITALS — HEIGHT: 63 IN | RESPIRATION RATE: 16 BRPM | HEART RATE: 85 BPM | WEIGHT: 190 LBS | BODY MASS INDEX: 33.66 KG/M2

## 2020-09-15 VITALS — SYSTOLIC BLOOD PRESSURE: 140 MMHG | DIASTOLIC BLOOD PRESSURE: 80 MMHG

## 2020-09-15 DIAGNOSIS — Z23 ENCOUNTER FOR IMMUNIZATION: ICD-10-CM

## 2020-09-15 PROCEDURE — 36415 COLL VENOUS BLD VENIPUNCTURE: CPT

## 2020-09-15 PROCEDURE — G0008: CPT

## 2020-09-15 PROCEDURE — 93000 ELECTROCARDIOGRAM COMPLETE: CPT

## 2020-09-15 PROCEDURE — 90662 IIV NO PRSV INCREASED AG IM: CPT

## 2020-09-15 PROCEDURE — 99214 OFFICE O/P EST MOD 30 MIN: CPT

## 2020-09-15 NOTE — PHYSICAL EXAM
[Normal Appearance] : normal appearance [General Appearance - Well Developed] : well developed [Well Groomed] : well groomed [General Appearance - Well Nourished] : well nourished [No Deformities] : no deformities [General Appearance - In No Acute Distress] : no acute distress [Normal Conjunctiva] : the conjunctiva exhibited no abnormalities [Eyelids - No Xanthelasma] : the eyelids demonstrated no xanthelasmas [No Oral Pallor] : no oral pallor [Normal Oral Mucosa] : normal oral mucosa [No Oral Cyanosis] : no oral cyanosis [Normal Jugular Venous A Waves Present] : normal jugular venous A waves present [No Jugular Venous Richardson A Waves] : no jugular venous richardson A waves [Normal Jugular Venous V Waves Present] : normal jugular venous V waves present [Exaggerated Use Of Accessory Muscles For Inspiration] : no accessory muscle use [Respiration, Rhythm And Depth] : normal respiratory rhythm and effort [Auscultation Breath Sounds / Voice Sounds] : lungs were clear to auscultation bilaterally [Abdomen Tenderness] : non-tender [Abdomen Soft] : soft [Abdomen Mass (___ Cm)] : no abdominal mass palpated [Abnormal Walk] : normal gait [Gait - Sufficient For Exercise Testing] : the gait was sufficient for exercise testing [Cyanosis, Localized] : no localized cyanosis [Petechial Hemorrhages (___cm)] : no petechial hemorrhages [Nail Clubbing] : no clubbing of the fingernails [Skin Color & Pigmentation] : normal skin color and pigmentation [] : no rash [No Venous Stasis] : no venous stasis [Skin Lesions] : no skin lesions [No Skin Ulcers] : no skin ulcer [No Xanthoma] : no  xanthoma was observed [Normal] : normal [Rhythm Regular] : regular [Normal S1] : normal S1 [Normal S2] : normal S2 [II] : a grade 2 [Right Carotid Bruit] : right carotid bruit heard [Left Carotid Bruit] : left carotid bruit heard

## 2020-09-17 ENCOUNTER — MED ADMIN CHARGE (OUTPATIENT)
Age: 80
End: 2020-09-17

## 2020-09-17 NOTE — REVIEW OF SYSTEMS
[Negative] : Eyes [Shortness Of Breath] : no shortness of breath [Chest Pain] : no chest pain [Cough] : no cough [Abdominal Pain] : no abdominal pain [Heartburn] : no heartburn [Dysphagia] : no dysphagia

## 2020-10-28 ENCOUNTER — RX RENEWAL (OUTPATIENT)
Age: 80
End: 2020-10-28

## 2020-12-21 PROBLEM — Z87.440 HISTORY OF URINARY TRACT INFECTION: Status: RESOLVED | Noted: 2019-06-03 | Resolved: 2020-12-21

## 2021-01-03 NOTE — REASON FOR VISIT
03-Jan-2021 15:28
[FreeTextEntry1] : Patient is a 79-year-old white female with diabetes mellitus and hypertension who was admitted to the hospital approximately 2 months ago with a blood loss stemming from a upper GI bleed warranting 2 units of packed red cells. Patient underwent an endoscopy and was found to have a gastric ulcer PPI agents. Aspirin was discontinued. She has since recovered endoscopy yesterday which proved to have resolved the ulcer. Recent hemoglobin A1c was 7.2 with LDL of 118\par 5/19/ 2020 \par wgt up due to coronavirus isolation last hgaic 8.7\par no sob sscp

## 2021-01-04 ENCOUNTER — NON-APPOINTMENT (OUTPATIENT)
Age: 81
End: 2021-01-04

## 2021-01-04 ENCOUNTER — APPOINTMENT (OUTPATIENT)
Dept: HEART AND VASCULAR | Facility: CLINIC | Age: 81
End: 2021-01-04
Payer: MEDICARE

## 2021-01-04 VITALS
HEART RATE: 94 BPM | BODY MASS INDEX: 34.2 KG/M2 | WEIGHT: 193 LBS | SYSTOLIC BLOOD PRESSURE: 150 MMHG | HEIGHT: 63 IN | DIASTOLIC BLOOD PRESSURE: 80 MMHG

## 2021-01-04 DIAGNOSIS — K92.2 GASTROINTESTINAL HEMORRHAGE, UNSPECIFIED: ICD-10-CM

## 2021-01-04 PROCEDURE — 93000 ELECTROCARDIOGRAM COMPLETE: CPT

## 2021-01-04 PROCEDURE — 93880 EXTRACRANIAL BILAT STUDY: CPT

## 2021-01-04 PROCEDURE — 36415 COLL VENOUS BLD VENIPUNCTURE: CPT

## 2021-01-04 PROCEDURE — 93306 TTE W/DOPPLER COMPLETE: CPT

## 2021-01-04 PROCEDURE — 99214 OFFICE O/P EST MOD 30 MIN: CPT

## 2021-01-04 NOTE — PHYSICAL EXAM
[General Appearance - Well Developed] : well developed [Normal Appearance] : normal appearance [Well Groomed] : well groomed [General Appearance - Well Nourished] : well nourished [No Deformities] : no deformities [General Appearance - In No Acute Distress] : no acute distress [Normal Conjunctiva] : the conjunctiva exhibited no abnormalities [Eyelids - No Xanthelasma] : the eyelids demonstrated no xanthelasmas [Normal Oral Mucosa] : normal oral mucosa [No Oral Pallor] : no oral pallor [No Oral Cyanosis] : no oral cyanosis [Normal Jugular Venous A Waves Present] : normal jugular venous A waves present [Normal Jugular Venous V Waves Present] : normal jugular venous V waves present [No Jugular Venous Richardson A Waves] : no jugular venous richardson A waves [Respiration, Rhythm And Depth] : normal respiratory rhythm and effort [Exaggerated Use Of Accessory Muscles For Inspiration] : no accessory muscle use [Auscultation Breath Sounds / Voice Sounds] : lungs were clear to auscultation bilaterally [Abdomen Soft] : soft [Abdomen Tenderness] : non-tender [Abdomen Mass (___ Cm)] : no abdominal mass palpated [Abnormal Walk] : normal gait [Gait - Sufficient For Exercise Testing] : the gait was sufficient for exercise testing [Nail Clubbing] : no clubbing of the fingernails [Cyanosis, Localized] : no localized cyanosis [Petechial Hemorrhages (___cm)] : no petechial hemorrhages [Skin Color & Pigmentation] : normal skin color and pigmentation [] : no rash [No Venous Stasis] : no venous stasis [Skin Lesions] : no skin lesions [No Skin Ulcers] : no skin ulcer [No Xanthoma] : no  xanthoma was observed [Normal] : normal [Rhythm Regular] : regular [Normal S1] : normal S1 [Normal S2] : normal S2 [II] : a grade 2 [Right Carotid Bruit] : right carotid bruit heard [Left Carotid Bruit] : left carotid bruit heard

## 2021-01-04 NOTE — ASSESSMENT
[FreeTextEntry1] : Assessment\par Will get DM blood LDL and Hgaic \par BP elevated w worsing LVH on echo will increase losartan to BID \par EF 55% with DD\par Carotid mild non obstructive plaque on statin \par Had UGI 1 yr ago on asa will hold for now \par BG control and wgt reviewed\par Pt under mod stress due to ill  (just had CVA)

## 2021-01-06 LAB
25(OH)D3 SERPL-MCNC: 28.1 NG/ML
ALBUMIN SERPL ELPH-MCNC: 4.2 G/DL
ALP BLD-CCNC: 101 U/L
ALT SERPL-CCNC: 14 U/L
ANION GAP SERPL CALC-SCNC: 17 MMOL/L
AST SERPL-CCNC: 15 U/L
BASOPHILS # BLD AUTO: 0.07 K/UL
BASOPHILS NFR BLD AUTO: 0.6 %
BILIRUB SERPL-MCNC: 0.3 MG/DL
BUN SERPL-MCNC: 18 MG/DL
CALCIUM SERPL-MCNC: 10 MG/DL
CHLORIDE SERPL-SCNC: 100 MMOL/L
CHOLEST SERPL-MCNC: 213 MG/DL
CO2 SERPL-SCNC: 20 MMOL/L
CREAT SERPL-MCNC: 0.68 MG/DL
EOSINOPHIL # BLD AUTO: 0.06 K/UL
EOSINOPHIL NFR BLD AUTO: 0.5 %
ESTIMATED AVERAGE GLUCOSE: 177 MG/DL
FOLATE SERPL-MCNC: 10 NG/ML
GLUCOSE SERPL-MCNC: 190 MG/DL
HBA1C MFR BLD HPLC: 7.8 %
HCT VFR BLD CALC: 40.4 %
HDLC SERPL-MCNC: 56 MG/DL
HGB BLD-MCNC: 12.5 G/DL
IMM GRANULOCYTES NFR BLD AUTO: 0.4 %
LDLC SERPL CALC-MCNC: 106 MG/DL
LYMPHOCYTES # BLD AUTO: 1.62 K/UL
LYMPHOCYTES NFR BLD AUTO: 14.7 %
MAN DIFF?: NORMAL
MCHC RBC-ENTMCNC: 26.5 PG
MCHC RBC-ENTMCNC: 30.9 GM/DL
MCV RBC AUTO: 85.8 FL
MONOCYTES # BLD AUTO: 0.56 K/UL
MONOCYTES NFR BLD AUTO: 5.1 %
NEUTROPHILS # BLD AUTO: 8.68 K/UL
NEUTROPHILS NFR BLD AUTO: 78.7 %
NONHDLC SERPL-MCNC: 158 MG/DL
PLATELET # BLD AUTO: 275 K/UL
POTASSIUM SERPL-SCNC: 4.1 MMOL/L
PROT SERPL-MCNC: 6.6 G/DL
RBC # BLD: 4.71 M/UL
RBC # FLD: 13.6 %
SARS-COV-2 IGG SERPL IA-ACNC: <0.1 INDEX
SARS-COV-2 IGG SERPL QL IA: NEGATIVE
SODIUM SERPL-SCNC: 137 MMOL/L
T3 SERPL-MCNC: 83 NG/DL
T3FREE SERPL-MCNC: 2.41 PG/ML
T4 FREE SERPL-MCNC: 1.1 NG/DL
T4 SERPL-MCNC: 6.3 UG/DL
TRIGL SERPL-MCNC: 258 MG/DL
TSH SERPL-ACNC: 2.21 UIU/ML
VIT B12 SERPL-MCNC: 634 PG/ML
WBC # FLD AUTO: 11.03 K/UL

## 2021-04-06 ENCOUNTER — NON-APPOINTMENT (OUTPATIENT)
Age: 81
End: 2021-04-06

## 2021-04-06 ENCOUNTER — APPOINTMENT (OUTPATIENT)
Dept: HEART AND VASCULAR | Facility: CLINIC | Age: 81
End: 2021-04-06
Payer: MEDICARE

## 2021-04-06 VITALS
HEART RATE: 94 BPM | SYSTOLIC BLOOD PRESSURE: 130 MMHG | DIASTOLIC BLOOD PRESSURE: 70 MMHG | BODY MASS INDEX: 33.49 KG/M2 | HEIGHT: 63 IN | WEIGHT: 189 LBS

## 2021-04-06 VITALS — DIASTOLIC BLOOD PRESSURE: 80 MMHG | SYSTOLIC BLOOD PRESSURE: 145 MMHG

## 2021-04-06 PROCEDURE — 93000 ELECTROCARDIOGRAM COMPLETE: CPT

## 2021-04-06 PROCEDURE — 36415 COLL VENOUS BLD VENIPUNCTURE: CPT

## 2021-04-06 PROCEDURE — 99214 OFFICE O/P EST MOD 30 MIN: CPT

## 2021-04-06 RX ORDER — SEMAGLUTIDE 1.34 MG/ML
2 INJECTION, SOLUTION SUBCUTANEOUS
Refills: 0 | Status: DISCONTINUED | COMMUNITY
End: 2021-04-06

## 2021-04-06 RX ORDER — SITAGLIPTIN 100 MG/1
100 TABLET, FILM COATED ORAL
Refills: 0 | Status: DISCONTINUED | COMMUNITY
End: 2021-04-06

## 2021-04-06 RX ORDER — ACARBOSE 50 MG/1
50 TABLET ORAL
Refills: 0 | Status: DISCONTINUED | COMMUNITY
Start: 2018-09-04 | End: 2021-04-06

## 2021-04-06 NOTE — ASSESSMENT
[FreeTextEntry1] : Assessment\par Will get DM blood LDL and Hgaic \par was taken off ozempic due to poor benefit \par on levemir 34-38 units qd w glipizide \par BP elevated w worsing LVH on echo will increase losartan to BID \par Bp better today \par new blood for DM pending will refer to new Endo

## 2021-04-06 NOTE — REASON FOR VISIT
[FreeTextEntry1] : Patient is a 79-year-old white female with diabetes mellitus and hypertension who was admitted to the hospital approximately 2 months ago with a blood loss stemming from a upper GI bleed warranting 2 units of packed red cells. Patient underwent an endoscopy and was found to have a gastric ulcer PPI agents. Aspirin was discontinued. She has since recovered endoscopy yesterday which proved to have resolved the ulcer. Recent hemoglobin A1c was 7.2 with LDL of 118\par 5/19/ 2020 \par wgt up due to coronavirus isolation last hgaic 8.7\par no sob sscp \par 4/6/21\par mod stress at home due to ill  who has CVA and hip fx and is bedridden \par Recent hgaic 7.8 \par Her usual endo retired for practice . No sscp or ellis

## 2021-04-07 ENCOUNTER — NON-APPOINTMENT (OUTPATIENT)
Age: 81
End: 2021-04-07

## 2021-04-07 LAB
CHOLEST SERPL-MCNC: 197 MG/DL
ESTIMATED AVERAGE GLUCOSE: 197 MG/DL
HBA1C MFR BLD HPLC: 8.5 %
HDLC SERPL-MCNC: 61 MG/DL
LDLC SERPL CALC-MCNC: 101 MG/DL
NONHDLC SERPL-MCNC: 136 MG/DL
TRIGL SERPL-MCNC: 175 MG/DL

## 2021-05-05 ENCOUNTER — RX RENEWAL (OUTPATIENT)
Age: 81
End: 2021-05-05

## 2021-07-12 ENCOUNTER — NON-APPOINTMENT (OUTPATIENT)
Age: 81
End: 2021-07-12

## 2021-07-12 ENCOUNTER — APPOINTMENT (OUTPATIENT)
Dept: HEART AND VASCULAR | Facility: CLINIC | Age: 81
End: 2021-07-12
Payer: MEDICARE

## 2021-07-12 VITALS
WEIGHT: 180 LBS | HEART RATE: 97 BPM | BODY MASS INDEX: 31.89 KG/M2 | DIASTOLIC BLOOD PRESSURE: 80 MMHG | HEIGHT: 63 IN | SYSTOLIC BLOOD PRESSURE: 140 MMHG

## 2021-07-12 PROCEDURE — 93000 ELECTROCARDIOGRAM COMPLETE: CPT

## 2021-07-12 PROCEDURE — 99214 OFFICE O/P EST MOD 30 MIN: CPT

## 2021-07-12 PROCEDURE — 36415 COLL VENOUS BLD VENIPUNCTURE: CPT

## 2021-07-13 LAB
25(OH)D3 SERPL-MCNC: 38.3 NG/ML
ALBUMIN SERPL ELPH-MCNC: 4.3 G/DL
ALP BLD-CCNC: 93 U/L
ALT SERPL-CCNC: 13 U/L
ANION GAP SERPL CALC-SCNC: 14 MMOL/L
AST SERPL-CCNC: 13 U/L
BASOPHILS # BLD AUTO: 0.07 K/UL
BASOPHILS NFR BLD AUTO: 0.6 %
BILIRUB SERPL-MCNC: 0.4 MG/DL
BUN SERPL-MCNC: 12 MG/DL
CALCIUM SERPL-MCNC: 10 MG/DL
CHLORIDE SERPL-SCNC: 103 MMOL/L
CHOLEST SERPL-MCNC: 182 MG/DL
CO2 SERPL-SCNC: 27 MMOL/L
CREAT SERPL-MCNC: 0.68 MG/DL
EOSINOPHIL # BLD AUTO: 0.08 K/UL
EOSINOPHIL NFR BLD AUTO: 0.7 %
ESTIMATED AVERAGE GLUCOSE: 171 MG/DL
FOLATE SERPL-MCNC: 4.4 NG/ML
GLUCOSE SERPL-MCNC: 100 MG/DL
HBA1C MFR BLD HPLC: 7.6 %
HCT VFR BLD CALC: 41.4 %
HDLC SERPL-MCNC: 52 MG/DL
HGB BLD-MCNC: 12.6 G/DL
IMM GRANULOCYTES NFR BLD AUTO: 0.3 %
LDLC SERPL CALC-MCNC: 82 MG/DL
LYMPHOCYTES # BLD AUTO: 1.94 K/UL
LYMPHOCYTES NFR BLD AUTO: 16.4 %
MAN DIFF?: NORMAL
MCHC RBC-ENTMCNC: 27.3 PG
MCHC RBC-ENTMCNC: 30.4 GM/DL
MCV RBC AUTO: 89.8 FL
MONOCYTES # BLD AUTO: 0.59 K/UL
MONOCYTES NFR BLD AUTO: 5 %
NEUTROPHILS # BLD AUTO: 9.14 K/UL
NEUTROPHILS NFR BLD AUTO: 77 %
NONHDLC SERPL-MCNC: 130 MG/DL
PLATELET # BLD AUTO: 284 K/UL
POTASSIUM SERPL-SCNC: 4.7 MMOL/L
PROT SERPL-MCNC: 6.8 G/DL
RBC # BLD: 4.61 M/UL
RBC # FLD: 14.1 %
SODIUM SERPL-SCNC: 144 MMOL/L
T3FREE SERPL-MCNC: 2.63 PG/ML
T4 FREE SERPL-MCNC: 1.2 NG/DL
T4 SERPL-MCNC: 7.6 UG/DL
TRIGL SERPL-MCNC: 236 MG/DL
TSH SERPL-ACNC: 1.83 UIU/ML
VIT B12 SERPL-MCNC: 584 PG/ML
WBC # FLD AUTO: 11.85 K/UL

## 2021-07-19 NOTE — REASON FOR VISIT
[FreeTextEntry1] : Patient is a 79-year-old white female with diabetes mellitus and hypertension who was admitted to the hospital approximately 2 months ago with a blood loss stemming from a upper GI bleed warranting 2 units of packed red cells. Patient underwent an endoscopy and was found to have a gastric ulcer PPI agents. Aspirin was discontinued. She has since recovered endoscopy yesterday which proved to have resolved the ulcer. Recent hemoglobin A1c was 7.2 with LDL of 118\par 5/19/ 2020 \par wgt up due to coronavirus isolation last hgaic 8.7\par no sob sscp \par 4/6/21\par mod stress at home due to ill  who has CVA and hip fx and is bedridden \par Recent hgaic 7.8 \par Her usual endo retired for practice . No sscp or ellis \par 7/1221\par  Overwhelmed with  illness \par BGM at 146-175 \par On trulicty from endo \par no sscp no ellis

## 2021-09-13 ENCOUNTER — APPOINTMENT (OUTPATIENT)
Dept: HEART AND VASCULAR | Facility: CLINIC | Age: 81
End: 2021-09-13
Payer: MEDICARE

## 2021-09-13 ENCOUNTER — NON-APPOINTMENT (OUTPATIENT)
Age: 81
End: 2021-09-13

## 2021-09-13 VITALS
HEART RATE: 79 BPM | DIASTOLIC BLOOD PRESSURE: 80 MMHG | RESPIRATION RATE: 16 BRPM | WEIGHT: 175 LBS | HEIGHT: 63 IN | SYSTOLIC BLOOD PRESSURE: 155 MMHG | BODY MASS INDEX: 31.01 KG/M2

## 2021-09-13 VITALS — DIASTOLIC BLOOD PRESSURE: 80 MMHG | SYSTOLIC BLOOD PRESSURE: 150 MMHG

## 2021-09-13 PROCEDURE — 99214 OFFICE O/P EST MOD 30 MIN: CPT

## 2021-09-13 PROCEDURE — 36415 COLL VENOUS BLD VENIPUNCTURE: CPT

## 2021-09-13 PROCEDURE — 93000 ELECTROCARDIOGRAM COMPLETE: CPT

## 2021-09-13 PROCEDURE — G0008: CPT | Mod: 59

## 2021-09-13 PROCEDURE — 90662 IIV NO PRSV INCREASED AG IM: CPT

## 2021-09-13 RX ORDER — DULAGLUTIDE 0.75 MG/.5ML
0.75 INJECTION, SOLUTION SUBCUTANEOUS
Qty: 1 | Refills: 2 | Status: DISCONTINUED | COMMUNITY
Start: 2021-07-12 | End: 2021-09-13

## 2021-09-13 NOTE — REASON FOR VISIT
[FreeTextEntry1] : Patient is a 79-year-old white female with diabetes mellitus and hypertension who was admitted to the hospital approximately 2 months ago with a blood loss stemming from a upper GI bleed warranting 2 units of packed red cells. Patient underwent an endoscopy and was found to have a gastric ulcer PPI agents. Aspirin was discontinued. She has since recovered endoscopy yesterday which proved to have resolved the ulcer. Recent hemoglobin A1c was 7.2 with LDL of 118\par 5/19/ 2020 \par wgt up due to coronavirus isolation last hgaic 8.7\par no sob sscp \par 4/6/21\par mod stress at home due to ill  who has CVA and hip fx and is bedridden \par 9/13/21\par lost total of 20 lbs on ozempic recent hgaic 7.6\par  no sscp or ellis \par stressed about frail ill spouse \par Recent hgaic 7.8 \par Her usual endo retired for practice . No sscp or ellis \par 7/1221\par  Overwhelmed with  illness \par BGM at 146-175 \par On trulicty from endo \par no sscp no ellis

## 2021-09-13 NOTE — ASSESSMENT
[FreeTextEntry1] : Assessment\par Will get DM blood LDL and Hgaic \par was taken off ozempic due to poor benefit \par on levemir 30 units qd w glipizide \par BP elevated w worsing LVH on echo will increase losartan to BID \par Bp better today \par new blood for DM pending will refer to new Endo \par flu shot given

## 2021-09-14 LAB
ESTIMATED AVERAGE GLUCOSE: 148 MG/DL
HBA1C MFR BLD HPLC: 6.8 %

## 2021-12-13 ENCOUNTER — NON-APPOINTMENT (OUTPATIENT)
Age: 81
End: 2021-12-13

## 2021-12-13 ENCOUNTER — APPOINTMENT (OUTPATIENT)
Dept: HEART AND VASCULAR | Facility: CLINIC | Age: 81
End: 2021-12-13
Payer: MEDICARE

## 2021-12-13 VITALS — BODY MASS INDEX: 30.48 KG/M2 | HEIGHT: 63 IN | WEIGHT: 172 LBS

## 2021-12-13 VITALS — DIASTOLIC BLOOD PRESSURE: 90 MMHG | SYSTOLIC BLOOD PRESSURE: 180 MMHG

## 2021-12-13 PROCEDURE — 93000 ELECTROCARDIOGRAM COMPLETE: CPT

## 2021-12-13 PROCEDURE — 99214 OFFICE O/P EST MOD 30 MIN: CPT

## 2021-12-13 RX ORDER — BLOOD SUGAR DIAGNOSTIC
STRIP MISCELLANEOUS
Refills: 0 | Status: ACTIVE | COMMUNITY

## 2021-12-13 NOTE — ASSESSMENT
[FreeTextEntry1] : CLEARED FOR PLANNED HYSTERECTOMY \par Reduce Levemir dose by half as she will be NPO

## 2021-12-13 NOTE — PHYSICAL EXAM
[General Appearance - Well Developed] : well developed [Normal Appearance] : normal appearance [Well Groomed] : well groomed [General Appearance - Well Nourished] : well nourished [No Deformities] : no deformities [General Appearance - In No Acute Distress] : no acute distress [Normal Conjunctiva] : the conjunctiva exhibited no abnormalities [Eyelids - No Xanthelasma] : the eyelids demonstrated no xanthelasmas [Normal Oral Mucosa] : normal oral mucosa [No Oral Pallor] : no oral pallor [No Oral Cyanosis] : no oral cyanosis [Normal Jugular Venous A Waves Present] : normal jugular venous A waves present [Normal Jugular Venous V Waves Present] : normal jugular venous V waves present [No Jugular Venous Richardson A Waves] : no jugular venous richardson A waves [] : no respiratory distress [Respiration, Rhythm And Depth] : normal respiratory rhythm and effort [Exaggerated Use Of Accessory Muscles For Inspiration] : no accessory muscle use [Auscultation Breath Sounds / Voice Sounds] : lungs were clear to auscultation bilaterally

## 2021-12-13 NOTE — HISTORY OF PRESENT ILLNESS
[Preoperative Visit] : for a medical evaluation prior to surgery [Scheduled Procedure ___] : a [unfilled] [Surgeon Name ___] : surgeon: [unfilled] [Good] : Good [Diabetes] : diabetes [Prior Anesthesia] : Prior anesthesia [Electrocardiogram] : ~T an ECG ~C was performed [Metabolic Capacity ___Mets%] : The patient has a metabolic capacity of [unfilled] Mets%  [Fever] : no fever [Chills] : no chills [Prev Anesthesia Reaction] : no previous anesthesia reaction [de-identified] : Dr Og [FreeTextEntry1] : preop for planned hysterectomy for bnl biopsy \par No sscp or ellis \par good effort toleranc e\par recent hgaic 6.8 on ozempic

## 2022-03-14 ENCOUNTER — APPOINTMENT (OUTPATIENT)
Dept: HEART AND VASCULAR | Facility: CLINIC | Age: 82
End: 2022-03-14

## 2022-04-25 ENCOUNTER — APPOINTMENT (OUTPATIENT)
Dept: HEART AND VASCULAR | Facility: CLINIC | Age: 82
End: 2022-04-25
Payer: MEDICARE

## 2022-04-25 ENCOUNTER — NON-APPOINTMENT (OUTPATIENT)
Age: 82
End: 2022-04-25

## 2022-04-25 VITALS
HEIGHT: 63 IN | DIASTOLIC BLOOD PRESSURE: 80 MMHG | WEIGHT: 168 LBS | HEART RATE: 93 BPM | BODY MASS INDEX: 29.77 KG/M2 | SYSTOLIC BLOOD PRESSURE: 138 MMHG

## 2022-04-25 DIAGNOSIS — R09.89 OTHER SPECIFIED SYMPTOMS AND SIGNS INVOLVING THE CIRCULATORY AND RESPIRATORY SYSTEMS: ICD-10-CM

## 2022-04-25 PROCEDURE — ZZZZZ: CPT

## 2022-04-25 PROCEDURE — 93880 EXTRACRANIAL BILAT STUDY: CPT

## 2022-04-25 PROCEDURE — 99214 OFFICE O/P EST MOD 30 MIN: CPT

## 2022-04-25 PROCEDURE — 93000 ELECTROCARDIOGRAM COMPLETE: CPT

## 2022-04-25 PROCEDURE — 93306 TTE W/DOPPLER COMPLETE: CPT

## 2022-04-25 NOTE — DISCUSSION/SUMMARY
[Hypertension] : hypertension [Continue] : continuing beta blockers [Dietary Modification] : dietary modification [Weight Loss] : weight loss [Low Sodium Diet] : low sodium diet [NSAIDs Avoidance] : non-steroidal anti-inflammatory drugs avoidance [Patient] : the patient

## 2022-04-25 NOTE — PHYSICAL EXAM
[facility]   DAILY PROGRESS NOTE    ADMISSION DATE:  7/18/2020  DATE:  8/27/2020  CURRENT HOSPITAL DAY:  Hospital Day: 41  ATTENDING PHYSICIAN:  Selwyn Ortega MD  CODE STATUS:  Full Resuscitation    CHIEF COMPLAINT:  AMS    ACTIVE PROBLEMS:    Patient Active Problem List   Diagnosis   • Altered mental status       INTERVAL HISTORY:     Live Cast is a 60 year old male patient admitted with Hyperkalemia [E87.5]  Altered mental status, unspecified altered mental status type [R41.82]  Sepsis (CMS/[facility]) [A41.9]  Sepsis with acute renal failure, due to unspecified organism, unspecified acute renal failure type, unspecified whether septic shock present (CMS/[facility]) [A41.9, R65.20, N17.9]  Suspected COVID-19 virus infection [Z20.828].   Patient is one day s/p PEG placement. He had significant groaning when I was loosening PEG. I spoke with nurse who stated that patient was having increased movement today. She would re-evaluate before we decide on restarting tube feeds. Repeat CBC also ordered.    HPI:   Live Cast is a 61 y/o male with medical history significant for chronic hepatitis C, HTN, CKD, type 2 DM, aortic root dilation, bicuspid aortic valve, seizure disorder, and depression who presented to ED 7/18/20 after being found unresponsive in his car by EMS and was found to be bradycardic, hypotensive, hypoglycemic, with lactic acidosis resulting in shock liver and required intubation and vasopressors. CPK markedly elevated indicative of rhabdomyolysis, likely secondary to compartment syndrome of RUE forearm s/p emergent fasciotomy on 7/18/20 then returned for debridement of right forearm wound and tenectomy with wound VAC placement 7/24 and was extubated post-procedure. Initial cause of AMS unclear but question of opiate overdose vs carbon monoxide poisoning. No evidence of trauma or MVA was indicated. CTOH initially was without acute intracranial abnormality. 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 Remainder of clinical course complicated by concern for viral zoster rash on face for which acyclovir was given and lumbar puncture performed 7/28 for concern of meningitis; CSF was without infection.     GI is now consulted for PEG tube placement. Patient unable to provide history so history obtained from chart review. No previous endoscopies noted. Imaging without findings suggestive of cirrhosis but noted to be s/p cholecystectomy. I called and spoke with daughter Summer regarding small scar over epigastric region and she denies any known surgeries or previous PEG tube placements in that region. No known family history of GI related malignancies.      MEDICATIONS:    The medication list was reviewed today.     HISTORIES:     I have reviewed the past medical history, family history, social history, medications and allergies listed in the medical record as obtained by my nursing staff and support staff and agree with their documentation.    OBJECTIVE:    VITAL SIGNS:     Vital Last Value 24 Hour Range   Temperature 98.9 °F (37.2 °C) (08/27/20 0955) Temp  Min: 97.6 °F (36.4 °C)  Max: 100.4 °F (38 °C)   Pulse (!) 107 (08/27/20 0800) Pulse  Min: 87  Max: 110   Respiratory 15 (08/27/20 0800) Resp  Min: 15  Max: 18   Non-Invasive  Blood Pressure 131/77 (08/27/20 0800) BP  Min: 90/54  Max: 134/89   Pulse Oximetry 98 % (08/27/20 0800) SpO2  Min: 95 %  Max: 100 %     Vital Today Admitted   Weight 80 kg (08/27/20 0500) Weight: 81.6 kg (07/18/20 0630)   Height N/A Height: 5' 7\" (170.2 cm) (07/18/20 1030)   BMI N/A BMI (Calculated): 30.42 (07/18/20 1030)     INTAKE/OUTPUT:      Intake/Output Summary (Last 24 hours) at 8/27/2020 1105  Last data filed at 8/26/2020 2110  Gross per 24 hour   Intake 550 ml   Output 1200 ml   Net -650 ml         PHYSICAL EXAM:    General: Adult male, NAD.    Neurologic: Alert. Does not respond to questions. No meaningful movements.   Skin: Warm and dry, normal turgor.  HEENT: Normocephalic. Normal conjunctivae and sclerae. External nose is normal to inspection.  Respiratory: Respiratory effort normal.   Cardiovascular: Regular rate and rhythm. No murmur appreciated.  Extremities: No swelling or tenderness in bilateral lower extremities. RUQ forearm with wound vac and well-healing stitches from recent fasciotomy and debridement.   Gastrointestinal:  Soft, round distention. Normal bowel sounds.Groaning in response to loosening PEG which now has bumper at 4. PEG site without any surrounding erythema, edema, or induration.     LABORATORY DATA:    Lab Results   Component Value Date    SODIUM 133 (L) 08/25/2020    POTASSIUM 4.4 08/25/2020    CHLORIDE 99 08/25/2020    CO2 25 08/25/2020    BUN 34 (H) 08/25/2020    CREATININE 0.93 08/25/2020    GLUCOSE 168 (H) 08/25/2020     Lab Results   Component Value Date    WBC 11.6 (H) 08/27/2020    HCT 29.5 (L) 08/27/2020    HGB 9.7 (L) 08/27/2020     08/27/2020     Lab Results   Component Value Date    GLUCOSE 168 (H) 08/25/2020    SODIUM 133 (L) 08/25/2020    POTASSIUM 4.4 08/25/2020    CHLORIDE 99 08/25/2020    BUN 34 (H) 08/25/2020    CREATININE 0.93 08/25/2020    CALCIUM 10.3 (H) 08/25/2020    ALBUMIN 2.5 (L) 08/25/2020    AST 20 08/25/2020    ALKPT 108 08/25/2020    GPT 22 08/25/2020    ANIONGAP 13 08/25/2020    BCRAT 37 (H) 08/25/2020    GLOB 5.8 (H) 08/25/2020    AGR 0.4 (L) 08/25/2020        IMAGING STUDIES:    Imaging studies reviewed.    CT a/p without contrast 8/12/20  Impression:  1. No acute findings the abdomen or pelvis.  2. Bilateral pleural effusions.  3. Nearly complete resolution of nonspecific and indeterminant free fluid in  the left retroperitoneum.    FL VSS 7/31/20  Impression:   1.  Deep laryngeal penetration with thin consistency by straw and with  nectar thick consistency by teaspoon, which is  subsequently cleared.  2.  Deep laryngeal penetration without aspiration with thin consistency by  spoon and cup.                 US Liver with doppler complete 7/19/20  Impression:  1. Patent hepatic vasculature with appropriate flow direction. Elevated  velocity in the proper hepatic artery is likely not clinically significant  given preservation of the intrahepatic arterial waveforms.  2. Unremarkable ultrasound of the liver.  3. Stable mild prominence of the common bile duct likely related to the  postcholecystectomy state.                CT chest, abdomen, pelvis without contrast 7/19/20  Impression:  1. Borderline uniform wall thickening of the bladder. Differential  diagnosis includes cystitis. However, lack of adequate distention limits  specificity.  2. Small bilateral pleural effusions with accompanying bibasilar minimal  consolidation versus atelectasis.  3. A small irregular shaped region of edema/fluid accumulation is present  in the retroperitoneum of the left lower quadrant of uncertain etiology but  includes passive edema.  4. Extensive reticular and confluent edema is demonstrated in the  subcutaneous tissues of the right flank and abdominal wall musculature.  Differential diagnosis includes cellulitis and asymmetric passive edema.  5. Abundant stool is present in the right hemicolon and transverse colon.    ENDOSCOPY:  EGD with PEG placement 8/26/20  Impression:   1. EGD done for PEG tube placement, with 20Fr PEG tube placed and secured at 3.5cm                           ASSESSMENT:     61 y/o male with medical history significant for chronic hepatitis C, HTN, CKD, type 2 DM, aortic root dilation, bicuspid aortic valve, seizure disorder, and depression who presented to ED 7/18/20 after being found unresponsive in his car with complicated subsequent clinical course. GI now consulted for PEG placement.     1. Encephalopathy with need for PEG placement              - Possibly hypoxic ischemic  encephalopathy but unclear cause.             - Various other differentials considered by Neurology who have now signed off. Palliative care now working with family regarding goals of care.              - Course complicated by acute hypoxemic respiratory failure s/p intubation and subsequent extubation.             - Now with dysphagia and long-term feeding requirements s/p PEG placement 8/26/20.     2. Rhabdomyolysis secondary to right forearm compartment syndrome             - CPK markedly elevated on admission, now back to baseline.              - S/p fasciotomy 7/18/20 and wound debridement with would vac placement 7/24/20.      3. Ischemic hepatitis, resolved              - Associated with lactic acidosis, hypotension, and markedly elevated transaminases which have now returned to normal.              - US and CT of liver unremarkable but note post-cholecystectomy state.     4. Chronic hepatitis C              - Per chart review. No Hep C antibody or RNA quant performed while inpatient.             - No signs of hepatic decompensation per labs/imaging.      5. Acute normocytic anemia             - Slow decline in Hg (15.2-->9.7) on admission.             - Anemia of chronic disease on recent iron studies (7/31/20).             - Without any outward GI bleeding. EGD 8/26 with no abnormalities.      6. RAHAT on CKD 2             - RAHAT secondary to ATN in setting of rhabdomyolysis and multiple hypotensive episodes.             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        ANTIPLATELET / ANTICOAGULATION:  SUbQ heparin per primary team    PLAN:    1. Okay to continue meds and flushes.  2. Pending clinical course today and presence of pain behavior to palpation of abdomen, could consider restarting TF this afternoon.  3. If continued abdominal pain, would recommend abdominal imaging.  4. Monitor fevers and WBC count.  5. PPI daily.    Thank you for involving us in the care  of this patient. Dr. Esteban is on call. Please contact us with any questions or concerns.     Maci Mcdowell PA-C  [facility]   [phone]               ATTENDING ADDENDUM:    I have seen the patient and agree with the plan as noted by Maci Mcdowell PAC.     Vital signs reviewed  GEN: NAD    Pertinent labs/medications/imaging reviewed and/or discussed.    Plan discussed and outlined as above.     Pa Esteban MD           [General Appearance - Well Developed] : well developed [Normal Appearance] : normal appearance [Well Groomed] : well groomed [General Appearance - Well Nourished] : well nourished [No Deformities] : no deformities [General Appearance - In No Acute Distress] : no acute distress [Normal Conjunctiva] : the conjunctiva exhibited no abnormalities [Eyelids - No Xanthelasma] : the eyelids demonstrated no xanthelasmas [Normal Oral Mucosa] : normal oral mucosa [No Oral Pallor] : no oral pallor [No Oral Cyanosis] : no oral cyanosis [Normal Jugular Venous A Waves Present] : normal jugular venous A waves present [Normal Jugular Venous V Waves Present] : normal jugular venous V waves present [No Jugular Venous Richardson A Waves] : no jugular venous richardson A waves [Respiration, Rhythm And Depth] : normal respiratory rhythm and effort [Exaggerated Use Of Accessory Muscles For Inspiration] : no accessory muscle use [Auscultation Breath Sounds / Voice Sounds] : lungs were clear to auscultation bilaterally [Abdomen Soft] : soft [Abdomen Tenderness] : non-tender [Abdomen Mass (___ Cm)] : no abdominal mass palpated [Abnormal Walk] : normal gait [Gait - Sufficient For Exercise Testing] : the gait was sufficient for exercise testing [Nail Clubbing] : no clubbing of the fingernails [Cyanosis, Localized] : no localized cyanosis [Petechial Hemorrhages (___cm)] : no petechial hemorrhages [Skin Color & Pigmentation] : normal skin color and pigmentation [] : no rash [No Venous Stasis] : no venous stasis [Skin Lesions] : no skin lesions [No Skin Ulcers] : no skin ulcer [No Xanthoma] : no  xanthoma was observed [Normal] : normal [Rhythm Regular] : regular [Normal S1] : normal S1 [Normal S2] : normal S2 [II] : a grade 2 [Right Carotid Bruit] : right carotid bruit heard [Left Carotid Bruit] : left carotid bruit heard

## 2022-04-26 LAB
ALBUMIN SERPL ELPH-MCNC: 4.2 G/DL
ALP BLD-CCNC: 99 U/L
ALT SERPL-CCNC: 21 U/L
ANION GAP SERPL CALC-SCNC: 14 MMOL/L
AST SERPL-CCNC: 16 U/L
BASOPHILS # BLD AUTO: 0.03 K/UL
BASOPHILS NFR BLD AUTO: 0.5 %
BILIRUB SERPL-MCNC: 0.4 MG/DL
BUN SERPL-MCNC: 18 MG/DL
CALCIUM SERPL-MCNC: 9.6 MG/DL
CHLORIDE SERPL-SCNC: 103 MMOL/L
CHOLEST SERPL-MCNC: 216 MG/DL
CO2 SERPL-SCNC: 26 MMOL/L
CREAT SERPL-MCNC: 0.7 MG/DL
EGFR: 87 ML/MIN/1.73M2
EOSINOPHIL # BLD AUTO: 0.04 K/UL
EOSINOPHIL NFR BLD AUTO: 0.7 %
ESTIMATED AVERAGE GLUCOSE: 143 MG/DL
FOLATE SERPL-MCNC: 13.2 NG/ML
GLUCOSE SERPL-MCNC: 120 MG/DL
HBA1C MFR BLD HPLC: 6.6 %
HCT VFR BLD CALC: 39 %
HDLC SERPL-MCNC: 56 MG/DL
HGB BLD-MCNC: 12.2 G/DL
IMM GRANULOCYTES NFR BLD AUTO: 0.4 %
LDLC SERPL CALC-MCNC: 124 MG/DL
LYMPHOCYTES # BLD AUTO: 0.95 K/UL
LYMPHOCYTES NFR BLD AUTO: 16.6 %
MAN DIFF?: NORMAL
MCHC RBC-ENTMCNC: 28.6 PG
MCHC RBC-ENTMCNC: 31.3 GM/DL
MCV RBC AUTO: 91.3 FL
MONOCYTES # BLD AUTO: 0.81 K/UL
MONOCYTES NFR BLD AUTO: 14.2 %
NEUTROPHILS # BLD AUTO: 3.86 K/UL
NEUTROPHILS NFR BLD AUTO: 67.6 %
NONHDLC SERPL-MCNC: 159 MG/DL
PLATELET # BLD AUTO: 205 K/UL
POTASSIUM SERPL-SCNC: 4.5 MMOL/L
PROT SERPL-MCNC: 6.8 G/DL
RBC # BLD: 4.27 M/UL
RBC # FLD: 14.2 %
SODIUM SERPL-SCNC: 142 MMOL/L
T3 SERPL-MCNC: 67 NG/DL
T3FREE SERPL-MCNC: 1.99 PG/ML
T4 FREE SERPL-MCNC: 0.9 NG/DL
T4 SERPL-MCNC: 6 UG/DL
TRIGL SERPL-MCNC: 175 MG/DL
TSH SERPL-ACNC: 1.81 UIU/ML
VIT B12 SERPL-MCNC: 476 PG/ML
WBC # FLD AUTO: 5.71 K/UL

## 2022-05-02 NOTE — HISTORY OF PRESENT ILLNESS
[FreeTextEntry1] : Patient is a 79-year-old white female with diabetes mellitus and hypertension who was admitted to the hospital approximately 2 months ago with a blood loss stemming from a upper GI bleed warranting 2 units of packed red cells. Patient underwent an endoscopy and was found to have a gastric ulcer PPI agents. Aspirin was discontinued. She has since recovered endoscopy yesterday which proved to have resolved the ulcer. Recent hemoglobin A1c was 7.2 with LDL of 118 5/19/ 2020  wgt up due to coronavirus isolation last hgaic 8.7 no sob sscp  4/6/21 mod stress at home due to ill  who has CVA and hip fx and is bedridden  9/13/21 lost total of 20 lbs on ozempic recent hgaic 7.6  no sscp or ellis  stressed about frail ill spouse  Recent hgaic 7.8  Her usual endo retired for practice . No sscp or ellis  7/1221  Overwhelmed with  illness  BGM at 146-175  On trulicty from endo  no sscp no ellis  4/25/22 recent VIRY and Rt for uterine ca  feels better  no sscp or ellis   on levemir 30 unites wgt down to 168 since surgery

## 2022-05-02 NOTE — ASSESSMENT
[FreeTextEntry1] : Assessment\par Will get DM blood LDL and Hgaic \par \par on levemir 30 units qd w glipizide \par \par Bp better today \par new blood for DM pending will refer to new Endo \par Echo Lvef 55% MILD VHD \par Carotid smooth plaque bilat

## 2022-05-23 ENCOUNTER — APPOINTMENT (OUTPATIENT)
Dept: HEART AND VASCULAR | Facility: CLINIC | Age: 82
End: 2022-05-23

## 2022-09-12 ENCOUNTER — APPOINTMENT (OUTPATIENT)
Dept: HEART AND VASCULAR | Facility: CLINIC | Age: 82
End: 2022-09-12

## 2022-09-12 ENCOUNTER — NON-APPOINTMENT (OUTPATIENT)
Age: 82
End: 2022-09-12

## 2022-09-12 VITALS
HEIGHT: 63 IN | BODY MASS INDEX: 29.77 KG/M2 | RESPIRATION RATE: 16 BRPM | DIASTOLIC BLOOD PRESSURE: 82 MMHG | HEART RATE: 80 BPM | SYSTOLIC BLOOD PRESSURE: 140 MMHG | WEIGHT: 168 LBS

## 2022-09-12 PROCEDURE — 93000 ELECTROCARDIOGRAM COMPLETE: CPT

## 2022-09-12 PROCEDURE — 99214 OFFICE O/P EST MOD 30 MIN: CPT

## 2022-09-12 PROCEDURE — G0008: CPT

## 2022-09-12 PROCEDURE — 90662 IIV NO PRSV INCREASED AG IM: CPT

## 2022-09-12 NOTE — ASSESSMENT
[FreeTextEntry1] : Assessment\par Will get DM blood LDL and Hgaic \par \par on levemir 30 units qd w glipizide \par \par Bp better today \par new blood for DM pending will refer to new Endo \par

## 2022-09-12 NOTE — DISCUSSION/SUMMARY
[Hypertension] : hypertension [Continue] : continuing beta blockers [Dietary Modification] : dietary modification [Weight Loss] : weight loss [Low Sodium Diet] : low sodium diet [NSAIDs Avoidance] : non-steroidal anti-inflammatory drugs avoidance [Patient] : the patient [EKG obtained to assist in diagnosis and management of assessed problem(s)] : EKG obtained to assist in diagnosis and management of assessed problem(s)

## 2022-09-12 NOTE — HISTORY OF PRESENT ILLNESS
[FreeTextEntry1] : 4/25/22 Patient is a 79-year-old white female with diabetes mellitus and hypertension who was admitted to the hospital approximately 2 months ago with a blood loss stemming from a upper GI bleed warranting 2 units of packed red cells. Patient underwent an endoscopy and was found to have a gastric ulcer PPI agents. Aspirin was discontinued. She has since recovered endoscopy yesterday which proved to have resolved the ulcer. Recent hemoglobin A1c was 7.2 with LDL of 118 5/19/ 2020  wgt up due to coronavirus isolation last hgaic 8.7 no sob sscp  4/6/21 mod stress at home due to ill  who has CVA and hip fx and is bedridden  9/13/21 lost total of 20 lbs on ozempic recent hgaic 7.6  no sscp or ellis  stressed about frail ill spouse  Recent hgaic 7.8  Her usual endo retired for practice . No sscp or ellis  7/1221  Overwhelmed with  illness  BGM at 146-175  On trulicty from endo  no sscp no ellis  4/25/22 recent VIRY and Rt for uterine ca  feels better  no sscp or ellis   on levemir 30 unites wgt down to 168 since surgery \par \par 9/12/22\par Doing well -140 \par no sscp or ellis \par seen by ONC no need for chemo \par

## 2022-12-19 ENCOUNTER — LABORATORY RESULT (OUTPATIENT)
Age: 82
End: 2022-12-19

## 2022-12-19 ENCOUNTER — APPOINTMENT (OUTPATIENT)
Dept: HEART AND VASCULAR | Facility: CLINIC | Age: 82
End: 2022-12-19

## 2022-12-19 ENCOUNTER — NON-APPOINTMENT (OUTPATIENT)
Age: 82
End: 2022-12-19

## 2022-12-19 VITALS
WEIGHT: 169 LBS | BODY MASS INDEX: 29.95 KG/M2 | DIASTOLIC BLOOD PRESSURE: 68 MMHG | HEART RATE: 85 BPM | SYSTOLIC BLOOD PRESSURE: 145 MMHG | HEIGHT: 63 IN | RESPIRATION RATE: 16 BRPM

## 2022-12-19 PROCEDURE — 99214 OFFICE O/P EST MOD 30 MIN: CPT

## 2022-12-19 PROCEDURE — 93000 ELECTROCARDIOGRAM COMPLETE: CPT

## 2022-12-19 NOTE — PHYSICAL EXAM
[Well Developed] : well developed [Well Nourished] : well nourished [No Acute Distress] : no acute distress [Normal Conjunctiva] : normal conjunctiva [Normal Venous Pressure] : normal venous pressure [No Carotid Bruit] : no carotid bruit [Clear Lung Fields] : clear lung fields [Good Air Entry] : good air entry [No Respiratory Distress] : no respiratory distress  [Soft] : abdomen soft [Non Tender] : non-tender [No Masses/organomegaly] : no masses/organomegaly [Normal Bowel Sounds] : normal bowel sounds [Normal Gait] : normal gait [No Edema] : no edema [No Cyanosis] : no cyanosis [No Clubbing] : no clubbing [No Varicosities] : no varicosities [No Rash] : no rash [No Skin Lesions] : no skin lesions [Moves all extremities] : moves all extremities [No Focal Deficits] : no focal deficits [Normal Speech] : normal speech [Alert and Oriented] : alert and oriented [Normal memory] : normal memory [de-identified] : The apical impulse was normal. The rhythm was regular. Heart sounds: normal S1, normal S2. \par A grade 2 systolic murmur was heard at the RUSB. \par Pulses: right carotid bruit heard and left carotid bruit heard.

## 2022-12-19 NOTE — HISTORY OF PRESENT ILLNESS
[FreeTextEntry1] : 4/25/22 Patient is a 79-year-old white female with diabetes mellitus and hypertension who was admitted to the hospital approximately 2 months ago with a blood loss stemming from a upper GI bleed warranting 2 units of packed red cells. Patient underwent an endoscopy and was found to have a gastric ulcer PPI agents. Aspirin was discontinued. She has since recovered endoscopy yesterday which proved to have resolved the ulcer. Recent hemoglobin A1c was 7.2 with LDL of 1185/19/ 2020 wgt up due to coronavirus isolation last hgaic 8.7no sob sscp 4/6/21mod stress at home due to ill  who has CVA and hip fx and is bedridden 9/13/21lost total of 20 lbs on ozempic recent hgaic 7.6 no sscp or meng stressed about frail ill spouse Recent hgaic 7.8 Her usual endo retired for practice . No sscp or meng 7/1221 Overwhelmed with  illness BGM at 146-175 On trulicty from endo no sscp no meng 4/25/22recent VIRY and Rt for uterine ca feels better no sscp or meng  on levemir 30 uniteswgt down to 168 since surgery \par \par 9/12/22\par Doing well -140 \par no sscp or meng \par seen by ONC no need for chemo \par \par 12/19/22.\par Denies Chest Pain, SOB, Dizziness, Leg edema, Orthopnea, PND, Palpitations, Syncope, MENG, Diaphoresis.\par Patient denies change in appetite, fatigue, heat or cold intolerance, myalgias, polydipsia, polyphagia, polyuria, tingling, numbness\par Home  highest 200 after poor diet \par

## 2022-12-19 NOTE — DISCUSSION/SUMMARY
[EKG obtained to assist in diagnosis and management of assessed problem(s)] : EKG obtained to assist in diagnosis and management of assessed problem(s) [FreeTextEntry1] : Hypertension: The impression is hypertension. Medication management includes continuing angiotensin receptor blockers and continuing beta blockers. Other planned treatment includes dietary modification, weight loss, low sodium diet and non-steroidal anti-inflammatory drugs avoidance. The plan was discussed with the patient. \par AODM has been off Ozempic ? reasons \par will check bloods

## 2022-12-19 NOTE — ASSESSMENT
[FreeTextEntry1] : Type 2 diabetes mellitus without complication (250.00) (E11.9)\par Essential (primary) hypertension (401.9) (I10)\par Bundle branch block, right (426.4) (I45.10)\par \par Assessment\par Will get DM blood LDL and Hgaic \par \par on levemir 30 units qd w glipizide \par \par Bp better today \par new blood for DM pending will refer to new Endo

## 2022-12-20 LAB
25(OH)D3 SERPL-MCNC: 39.4 NG/ML
ALBUMIN SERPL ELPH-MCNC: 3.7 G/DL
ALP BLD-CCNC: 92 U/L
ALT SERPL-CCNC: 13 U/L
ANION GAP SERPL CALC-SCNC: 10 MMOL/L
AST SERPL-CCNC: 17 U/L
BASOPHILS # BLD AUTO: 0.03 K/UL
BASOPHILS NFR BLD AUTO: 0.5 %
BILIRUB SERPL-MCNC: 0.3 MG/DL
BUN SERPL-MCNC: 22 MG/DL
CALCIUM SERPL-MCNC: 9.2 MG/DL
CHLORIDE SERPL-SCNC: 106 MMOL/L
CHOLEST SERPL-MCNC: 148 MG/DL
CO2 SERPL-SCNC: 28 MMOL/L
CREAT SERPL-MCNC: 0.66 MG/DL
EGFR: 88 ML/MIN/1.73M2
EOSINOPHIL # BLD AUTO: 0.1 K/UL
EOSINOPHIL NFR BLD AUTO: 1.6 %
ESTIMATED AVERAGE GLUCOSE: 143 MG/DL
FOLATE SERPL-MCNC: 10.7 NG/ML
GLUCOSE SERPL-MCNC: 103 MG/DL
HBA1C MFR BLD HPLC: 6.6 %
HCT VFR BLD CALC: 37 %
HDLC SERPL-MCNC: 47 MG/DL
HGB BLD-MCNC: 11.3 G/DL
IMM GRANULOCYTES NFR BLD AUTO: 0.3 %
LDLC SERPL CALC-MCNC: 79 MG/DL
LYMPHOCYTES # BLD AUTO: 1.1 K/UL
LYMPHOCYTES NFR BLD AUTO: 17.3 %
MAN DIFF?: NORMAL
MCHC RBC-ENTMCNC: 27.7 PG
MCHC RBC-ENTMCNC: 30.5 GM/DL
MCV RBC AUTO: 90.7 FL
MONOCYTES # BLD AUTO: 0.36 K/UL
MONOCYTES NFR BLD AUTO: 5.7 %
NEUTROPHILS # BLD AUTO: 4.75 K/UL
NEUTROPHILS NFR BLD AUTO: 74.6 %
NONHDLC SERPL-MCNC: 102 MG/DL
PLATELET # BLD AUTO: 223 K/UL
POTASSIUM SERPL-SCNC: 4.4 MMOL/L
PROT SERPL-MCNC: 6.1 G/DL
RBC # BLD: 4.08 M/UL
RBC # FLD: 13.4 %
SODIUM SERPL-SCNC: 144 MMOL/L
T3 SERPL-MCNC: 93 NG/DL
T3FREE SERPL-MCNC: 2.45 PG/ML
T3RU NFR SERPL: 1 TBI
T4 FREE SERPL-MCNC: 1.1 NG/DL
T4 SERPL-MCNC: 6.8 UG/DL
TRIGL SERPL-MCNC: 113 MG/DL
TSH SERPL-ACNC: 1.77 UIU/ML
VIT B12 SERPL-MCNC: 631 PG/ML
WBC # FLD AUTO: 6.36 K/UL

## 2022-12-21 ENCOUNTER — NON-APPOINTMENT (OUTPATIENT)
Age: 82
End: 2022-12-21

## 2023-04-24 ENCOUNTER — APPOINTMENT (OUTPATIENT)
Dept: HEART AND VASCULAR | Facility: CLINIC | Age: 83
End: 2023-04-24

## 2023-07-26 ENCOUNTER — APPOINTMENT (OUTPATIENT)
Dept: HEART AND VASCULAR | Facility: CLINIC | Age: 83
End: 2023-07-26
Payer: MEDICARE

## 2023-07-26 ENCOUNTER — NON-APPOINTMENT (OUTPATIENT)
Age: 83
End: 2023-07-26

## 2023-07-26 ENCOUNTER — LABORATORY RESULT (OUTPATIENT)
Age: 83
End: 2023-07-26

## 2023-07-26 VITALS
HEART RATE: 104 BPM | WEIGHT: 154 LBS | DIASTOLIC BLOOD PRESSURE: 80 MMHG | SYSTOLIC BLOOD PRESSURE: 160 MMHG | HEIGHT: 63 IN | BODY MASS INDEX: 27.29 KG/M2

## 2023-07-26 VITALS — DIASTOLIC BLOOD PRESSURE: 80 MMHG | SYSTOLIC BLOOD PRESSURE: 150 MMHG

## 2023-07-26 PROCEDURE — 93000 ELECTROCARDIOGRAM COMPLETE: CPT

## 2023-07-26 PROCEDURE — ZZZZZ: CPT

## 2023-07-26 PROCEDURE — 93306 TTE W/DOPPLER COMPLETE: CPT

## 2023-07-26 PROCEDURE — 99214 OFFICE O/P EST MOD 30 MIN: CPT

## 2023-07-26 RX ORDER — DEXAMETHASONE 4 MG/1
4 TABLET ORAL
Refills: 0 | Status: ACTIVE | COMMUNITY

## 2023-07-26 RX ORDER — DOCUSATE SODIUM 100 MG/1
100 CAPSULE ORAL 3 TIMES DAILY
Refills: 0 | Status: ACTIVE | COMMUNITY

## 2023-07-26 RX ORDER — LORATADINE 10 MG
17 TABLET,DISINTEGRATING ORAL DAILY
Refills: 0 | Status: ACTIVE | COMMUNITY

## 2023-07-26 RX ORDER — PEMBROLIZUMAB 25 MG/ML
INJECTION, SOLUTION INTRAVENOUS
Refills: 0 | Status: ACTIVE | COMMUNITY

## 2023-07-26 RX ORDER — ONDANSETRON HYDROCHLORIDE 8 MG/1
8 TABLET, FILM COATED ORAL
Refills: 0 | Status: ACTIVE | COMMUNITY

## 2023-07-26 NOTE — HISTORY OF PRESENT ILLNESS
[FreeTextEntry1] : 4/25/22 Patient is a 79-year-old white female with diabetes mellitus and hypertension who was admitted to the hospital approximately 2 months ago with a blood loss stemming from a upper GI bleed warranting 2 units of packed red cells. Patient underwent an endoscopy and was found to have a gastric ulcer PPI agents. Aspirin was discontinued. She has since recovered endoscopy yesterday which proved to have resolved the ulcer. Recent hemoglobin A1c was 7.2 with LDL of 1185/19/ 2020 wgt up due to coronavirus isolation last hgaic 8.7no sob sscp 4/6/21mod stress at home due to ill  who has CVA and hip fx and is bedridden 9/13/21lost total of 20 lbs on ozempic recent hgaic 7.6 no sscp or meng stressed about frail ill spouse Recent hgaic 7.8 Her usual endo retired for practice . No sscp or meng 7/1221 Overwhelmed with  illness BGM at 146-175 On trulicty from endo no sscp no meng 4/25/22recent VIRY and Rt for uterine ca feels better no sscp or meng  on levemir 30 uniteswgt down to 168 since surgery \par 9/12/22\par Doing well -140 \par no sscp or meng \par seen by ONC no need for chemo \par 12/19/22.\par Denies Chest Pain, SOB, Dizziness, Leg edema, Orthopnea, PND, Palpitations, Syncope, MENG, Diaphoresis.\par Patient denies change in appetite, fatigue, heat or cold intolerance, myalgias, polydipsia, polyphagia, polyuria, tingling, numbness\par Home  highest 200 after poor diet \par 07/26/23\par on chemo and RT for uterine cancer had hip fx due to mets \par chemo is TAXOL . carboplatin \par walks w walker no pain \par

## 2023-07-26 NOTE — ASSESSMENT
[FreeTextEntry1] : Assessment\par On chemo and RT for uterine cancer\par elev BP and edema noted with decadron use \par Lvef 55% MILD TR PI mild diast dysfx \par will add HCTZ 12.5 \par \par \par

## 2023-07-26 NOTE — ADDENDUM
[FreeTextEntry1] : I, Alfred Hernandez, assisted in documentation on 07/26/2023 acting as a scribe for Dr. Latrell Lima.\par \par

## 2023-07-26 NOTE — PHYSICAL EXAM
[Well Developed] : well developed [Well Nourished] : well nourished [No Acute Distress] : no acute distress [Normal Conjunctiva] : normal conjunctiva [Normal Venous Pressure] : normal venous pressure [No Carotid Bruit] : no carotid bruit [Normal] : normal [Rhythm Regular] : regular [Normal S1] : normal S1 [Normal S2] : normal S2 [II] : a grade 2 [Right Carotid Bruit] : right carotid bruit heard [Left Carotid Bruit] : left carotid bruit heard [Clear Lung Fields] : clear lung fields [Good Air Entry] : good air entry [No Respiratory Distress] : no respiratory distress  [Soft] : abdomen soft [Non Tender] : non-tender [No Masses/organomegaly] : no masses/organomegaly [Normal Bowel Sounds] : normal bowel sounds [Normal Gait] : normal gait [No Cyanosis] : no cyanosis [No Clubbing] : no clubbing [No Varicosities] : no varicosities [No Rash] : no rash [No Skin Lesions] : no skin lesions [Moves all extremities] : moves all extremities [No Focal Deficits] : no focal deficits [Normal Speech] : normal speech [Alert and Oriented] : alert and oriented [Normal memory] : normal memory [de-identified] : 1+ edema

## 2023-07-26 NOTE — DISCUSSION/SUMMARY
[EKG obtained to assist in diagnosis and management of assessed problem(s)] : EKG obtained to assist in diagnosis and management of assessed problem(s) [FreeTextEntry1] : Edema and BP elev due to meds\par echo and plan of care outlined \par

## 2023-07-27 ENCOUNTER — NON-APPOINTMENT (OUTPATIENT)
Age: 83
End: 2023-07-27

## 2023-07-27 LAB
25(OH)D3 SERPL-MCNC: 34.7 NG/ML
ALBUMIN SERPL ELPH-MCNC: 4 G/DL
ALP BLD-CCNC: 210 U/L
ALT SERPL-CCNC: 20 U/L
ANION GAP SERPL CALC-SCNC: 15 MMOL/L
AST SERPL-CCNC: 18 U/L
BILIRUB SERPL-MCNC: 0.3 MG/DL
BUN SERPL-MCNC: 20 MG/DL
CALCIUM SERPL-MCNC: 9.3 MG/DL
CHLORIDE SERPL-SCNC: 99 MMOL/L
CHOLEST SERPL-MCNC: 218 MG/DL
CO2 SERPL-SCNC: 23 MMOL/L
CREAT SERPL-MCNC: 0.57 MG/DL
EGFR: 91 ML/MIN/1.73M2
ESTIMATED AVERAGE GLUCOSE: 163 MG/DL
FOLATE SERPL-MCNC: 12.7 NG/ML
GLUCOSE SERPL-MCNC: 261 MG/DL
HBA1C MFR BLD HPLC: 7.3 %
HDLC SERPL-MCNC: 63 MG/DL
LDLC SERPL CALC-MCNC: 109 MG/DL
NONHDLC SERPL-MCNC: 155 MG/DL
NT-PROBNP SERPL-MCNC: 297 PG/ML
POTASSIUM SERPL-SCNC: 4.4 MMOL/L
PROT SERPL-MCNC: 6.2 G/DL
SODIUM SERPL-SCNC: 136 MMOL/L
T3FREE SERPL-MCNC: 2.32 PG/ML
T3RU NFR SERPL: 0.9 TBI
T4 FREE SERPL-MCNC: 1.4 NG/DL
T4 SERPL-MCNC: 7.9 UG/DL
TRIGL SERPL-MCNC: 270 MG/DL
TSH SERPL-ACNC: 1.43 UIU/ML
VIT B12 SERPL-MCNC: 681 PG/ML

## 2023-11-22 ENCOUNTER — NON-APPOINTMENT (OUTPATIENT)
Age: 83
End: 2023-11-22

## 2023-11-22 ENCOUNTER — APPOINTMENT (OUTPATIENT)
Dept: HEART AND VASCULAR | Facility: CLINIC | Age: 83
End: 2023-11-22
Payer: MEDICARE

## 2023-11-22 VITALS
DIASTOLIC BLOOD PRESSURE: 68 MMHG | HEART RATE: 92 BPM | BODY MASS INDEX: 25.69 KG/M2 | WEIGHT: 145 LBS | SYSTOLIC BLOOD PRESSURE: 120 MMHG | HEIGHT: 63 IN

## 2023-11-22 DIAGNOSIS — I45.10 UNSPECIFIED RIGHT BUNDLE-BRANCH BLOCK: ICD-10-CM

## 2023-11-22 PROCEDURE — 93000 ELECTROCARDIOGRAM COMPLETE: CPT

## 2023-11-22 PROCEDURE — 99214 OFFICE O/P EST MOD 30 MIN: CPT

## 2023-11-22 RX ORDER — GLIPIZIDE 5 MG/1
5 TABLET, FILM COATED, EXTENDED RELEASE ORAL DAILY
Qty: 30 | Refills: 0 | Status: DISCONTINUED | COMMUNITY
End: 2023-11-22

## 2023-11-22 RX ORDER — SIMVASTATIN 40 MG/1
40 TABLET, FILM COATED ORAL DAILY
Qty: 90 | Refills: 3 | Status: ACTIVE | COMMUNITY
Start: 1900-01-01 | End: 1900-01-01

## 2023-11-22 RX ORDER — SEMAGLUTIDE 1.34 MG/ML
2 INJECTION, SOLUTION SUBCUTANEOUS
Qty: 4.5 | Refills: 3 | Status: ACTIVE | COMMUNITY
Start: 2021-09-13

## 2024-03-01 ENCOUNTER — APPOINTMENT (OUTPATIENT)
Dept: HEART AND VASCULAR | Facility: CLINIC | Age: 84
End: 2024-03-01
Payer: MEDICARE

## 2024-03-01 ENCOUNTER — NON-APPOINTMENT (OUTPATIENT)
Age: 84
End: 2024-03-01

## 2024-03-01 VITALS
HEIGHT: 63 IN | WEIGHT: 145 LBS | SYSTOLIC BLOOD PRESSURE: 120 MMHG | DIASTOLIC BLOOD PRESSURE: 60 MMHG | BODY MASS INDEX: 25.69 KG/M2 | HEART RATE: 85 BPM

## 2024-03-01 DIAGNOSIS — Z00.00 ENCOUNTER FOR GENERAL ADULT MEDICAL EXAMINATION W/OUT ABNORMAL FINDINGS: ICD-10-CM

## 2024-03-01 DIAGNOSIS — E11.9 TYPE 2 DIABETES MELLITUS W/OUT COMPLICATIONS: ICD-10-CM

## 2024-03-01 DIAGNOSIS — I10 ESSENTIAL (PRIMARY) HYPERTENSION: ICD-10-CM

## 2024-03-01 DIAGNOSIS — E78.5 HYPERLIPIDEMIA, UNSPECIFIED: ICD-10-CM

## 2024-03-01 PROCEDURE — 93000 ELECTROCARDIOGRAM COMPLETE: CPT

## 2024-03-01 PROCEDURE — G2211 COMPLEX E/M VISIT ADD ON: CPT

## 2024-03-01 PROCEDURE — 99214 OFFICE O/P EST MOD 30 MIN: CPT

## 2024-03-01 RX ORDER — INSULIN DETEMIR 100 [IU]/ML
100 INJECTION, SOLUTION SUBCUTANEOUS
Qty: 5 | Refills: 3 | Status: ACTIVE | COMMUNITY
Start: 2018-05-08

## 2024-03-01 RX ORDER — HYDROCHLOROTHIAZIDE 12.5 MG/1
12.5 TABLET ORAL DAILY
Qty: 90 | Refills: 3 | Status: ACTIVE | COMMUNITY
Start: 2023-07-26

## 2024-03-01 NOTE — PHYSICAL EXAM
[No Acute Distress] : no acute distress [Normal Conjunctiva] : normal conjunctiva [Normal Venous Pressure] : normal venous pressure [No Carotid Bruit] : no carotid bruit [Normal] : normal [Normal S1] : normal S1 [Rhythm Regular] : regular [II] : a grade 2 [Normal S2] : normal S2 [Right Carotid Bruit] : right carotid bruit heard [Left Carotid Bruit] : left carotid bruit heard [Clear Lung Fields] : clear lung fields [Good Air Entry] : good air entry [Soft] : abdomen soft [No Respiratory Distress] : no respiratory distress  [Non Tender] : non-tender [No Masses/organomegaly] : no masses/organomegaly [Normal Bowel Sounds] : normal bowel sounds [Normal Gait] : normal gait [No Cyanosis] : no cyanosis [No Clubbing] : no clubbing [No Rash] : no rash [No Varicosities] : no varicosities [No Skin Lesions] : no skin lesions [No Focal Deficits] : no focal deficits [Moves all extremities] : moves all extremities [Alert and Oriented] : alert and oriented [Normal Speech] : normal speech [Normal memory] : normal memory [Frail] : frail [Ill-Appearing] : ill-appearing [de-identified] : 1+ edema

## 2024-03-01 NOTE — HISTORY OF PRESENT ILLNESS
[FreeTextEntry1] : 4/25/22 Patient is a 79-year-old white female with diabetes mellitus and hypertension who was admitted to the hospital approximately 2 months ago with a blood loss stemming from a upper GI bleed warranting 2 units of packed red cells. Patient underwent an endoscopy and was found to have a gastric ulcer PPI agents. Aspirin was discontinued. She has since recovered endoscopy yesterday which proved to have resolved the ulcer. Recent hemoglobin A1c was 7.2 with LDL of 1185/19/ 2020 wgt up due to coronavirus isolation last hgaic 8.7no sob sscp 4/6/21mod stress at home due to ill  who has CVA and hip fx and is bedridden 9/13/21lost total of 20 lbs on ozempic recent hgaic 7.6 no sscp or meng stressed about frail ill spouse Recent hgaic 7.8 Her usual endo retired for practice . No sscp or meng 7/1221 Overwhelmed with  illness BGM at 146-175 On trulicty from endo no sscp no meng 4/25/22recent VIRY and Rt for uterine ca feels better no sscp or meng  on levemir 30 uniteswgt down to 168 since surgery   9/12/22 Doing well -140  no sscp or meng  seen by ONC no need for chemo   12/19/22. Denies Chest Pain, SOB, Dizziness, Leg edema, Orthopnea, PND, Palpitations, Syncope, MENG, Diaphoresis. Patient denies change in appetite, fatigue, heat or cold intolerance, myalgias, polydipsia, polyphagia, polyuria, tingling, numbness Home  highest 200 after poor diet   07/26/23 on chemo and RT for uterine cancer had hip fx due to mets  chemo is TAXOL . carboplatin  walks w walker no pain   11/22/23 s/p Chemo on  Ketruyda   on bisphosphonates  Pet scan in 12/23 to assess uterine ca disease  Hgaic 5.8 after post chemo wgt loss   3/1/24   passed in 11/23  getting chemo at [facility]  HCT 26 lytes ok  poor appetite

## 2024-03-01 NOTE — DISCUSSION/SUMMARY
[FreeTextEntry1] : BP stable on medd AODM meds reviewed hgaic pending BP meds continue [EKG obtained to assist in diagnosis and management of assessed problem(s)] : EKG obtained to assist in diagnosis and management of assessed problem(s)

## 2024-03-03 LAB
CHOLEST SERPL-MCNC: 144 MG/DL
ESTIMATED AVERAGE GLUCOSE: 126 MG/DL
HBA1C MFR BLD HPLC: 6 %
HDLC SERPL-MCNC: 45 MG/DL
LDLC SERPL CALC-MCNC: 69 MG/DL
NONHDLC SERPL-MCNC: 99 MG/DL
TRIGL SERPL-MCNC: 176 MG/DL

## 2024-03-04 ENCOUNTER — NON-APPOINTMENT (OUTPATIENT)
Age: 84
End: 2024-03-04

## 2024-03-20 RX ORDER — BLOOD SUGAR DIAGNOSTIC
STRIP MISCELLANEOUS
Qty: 100 | Refills: 3 | Status: ACTIVE | COMMUNITY
Start: 2019-07-15 | End: 1900-01-01

## 2024-03-20 RX ORDER — METOPROLOL SUCCINATE 50 MG/1
50 TABLET, EXTENDED RELEASE ORAL DAILY
Qty: 90 | Refills: 2 | Status: ACTIVE | COMMUNITY
Start: 1900-01-01 | End: 1900-01-01

## 2024-04-08 RX ORDER — LOSARTAN POTASSIUM 100 MG/1
100 TABLET, FILM COATED ORAL
Qty: 90 | Refills: 1 | Status: ACTIVE | COMMUNITY
Start: 2019-08-08 | End: 1900-01-01

## 2024-04-08 RX ORDER — PANTOPRAZOLE 40 MG/1
40 TABLET, DELAYED RELEASE ORAL
Qty: 90 | Refills: 3 | Status: ACTIVE | COMMUNITY
Start: 2020-01-13 | End: 1900-01-01

## 2024-05-02 RX ORDER — AZITHROMYCIN 250 MG/1
250 TABLET, FILM COATED ORAL
Qty: 6 | Refills: 0 | Status: ACTIVE | COMMUNITY
Start: 2024-05-02 | End: 1900-01-01

## 2024-05-09 ENCOUNTER — APPOINTMENT (OUTPATIENT)
Dept: HEART AND VASCULAR | Facility: CLINIC | Age: 84
End: 2024-05-09

## 2024-08-26 RX ORDER — BLOOD SUGAR DIAGNOSTIC
STRIP MISCELLANEOUS DAILY
Qty: 100 | Refills: 5 | Status: ACTIVE | COMMUNITY
Start: 2024-08-26 | End: 1900-01-01

## 2024-08-26 RX ORDER — BLOOD-GLUCOSE METER
W/DEVICE KIT MISCELLANEOUS
Qty: 1 | Refills: 3 | Status: ACTIVE | COMMUNITY
Start: 2024-08-26 | End: 1900-01-01

## 2024-10-14 ENCOUNTER — NON-APPOINTMENT (OUTPATIENT)
Age: 84
End: 2024-10-14

## 2024-10-14 ENCOUNTER — APPOINTMENT (OUTPATIENT)
Dept: HEART AND VASCULAR | Facility: CLINIC | Age: 84
End: 2024-10-14
Payer: MEDICARE

## 2024-10-14 VITALS
SYSTOLIC BLOOD PRESSURE: 140 MMHG | BODY MASS INDEX: 25.34 KG/M2 | DIASTOLIC BLOOD PRESSURE: 78 MMHG | HEART RATE: 84 BPM | HEIGHT: 63 IN | WEIGHT: 143 LBS

## 2024-10-14 DIAGNOSIS — Z00.00 ENCOUNTER FOR GENERAL ADULT MEDICAL EXAMINATION W/OUT ABNORMAL FINDINGS: ICD-10-CM

## 2024-10-14 PROCEDURE — G2211 COMPLEX E/M VISIT ADD ON: CPT

## 2024-10-14 PROCEDURE — 90662 IIV NO PRSV INCREASED AG IM: CPT

## 2024-10-14 PROCEDURE — 93000 ELECTROCARDIOGRAM COMPLETE: CPT

## 2024-10-14 PROCEDURE — 93306 TTE W/DOPPLER COMPLETE: CPT

## 2024-10-14 PROCEDURE — 99214 OFFICE O/P EST MOD 30 MIN: CPT

## 2024-10-14 PROCEDURE — G0008: CPT | Mod: 59

## 2024-10-14 RX ORDER — INSULIN GLARGINE 100 [IU]/ML
100 INJECTION, SOLUTION SUBCUTANEOUS
Qty: 1 | Refills: 0 | Status: ACTIVE | COMMUNITY
Start: 2024-10-14

## 2025-02-14 ENCOUNTER — APPOINTMENT (OUTPATIENT)
Dept: HEART AND VASCULAR | Facility: CLINIC | Age: 85
End: 2025-02-14

## 2025-06-06 ENCOUNTER — NON-APPOINTMENT (OUTPATIENT)
Age: 85
End: 2025-06-06

## 2025-06-06 ENCOUNTER — APPOINTMENT (OUTPATIENT)
Dept: HEART AND VASCULAR | Facility: CLINIC | Age: 85
End: 2025-06-06
Payer: MEDICARE

## 2025-06-06 VITALS
WEIGHT: 145 LBS | HEART RATE: 86 BPM | HEIGHT: 63 IN | RESPIRATION RATE: 16 BRPM | BODY MASS INDEX: 25.69 KG/M2 | DIASTOLIC BLOOD PRESSURE: 70 MMHG | SYSTOLIC BLOOD PRESSURE: 138 MMHG

## 2025-06-06 PROCEDURE — G2211 COMPLEX E/M VISIT ADD ON: CPT

## 2025-06-06 PROCEDURE — 99214 OFFICE O/P EST MOD 30 MIN: CPT

## 2025-06-06 PROCEDURE — 93000 ELECTROCARDIOGRAM COMPLETE: CPT

## 2025-06-09 ENCOUNTER — NON-APPOINTMENT (OUTPATIENT)
Age: 85
End: 2025-06-09

## 2025-06-09 LAB
CHOLEST SERPL-MCNC: 162 MG/DL
ESTIMATED AVERAGE GLUCOSE: 134 MG/DL
HBA1C MFR BLD HPLC: 6.3 %
HDLC SERPL-MCNC: 47 MG/DL
LDLC SERPL-MCNC: 84 MG/DL
NONHDLC SERPL-MCNC: 115 MG/DL
TRIGL SERPL-MCNC: 181 MG/DL

## 2025-06-18 RX ORDER — BLOOD-GLUCOSE METER
KIT MISCELLANEOUS
Qty: 100 | Refills: 5 | Status: ACTIVE | COMMUNITY
Start: 2025-06-18 | End: 1900-01-01